# Patient Record
Sex: FEMALE | Race: WHITE | Employment: FULL TIME | URBAN - METROPOLITAN AREA
[De-identification: names, ages, dates, MRNs, and addresses within clinical notes are randomized per-mention and may not be internally consistent; named-entity substitution may affect disease eponyms.]

---

## 2017-10-26 ENCOUNTER — HOSPITAL ENCOUNTER (OUTPATIENT)
Age: 37
Setting detail: SPECIMEN
Discharge: HOME | End: 2017-10-26
Payer: COMMERCIAL

## 2017-10-26 ENCOUNTER — OFFICE VISIT (OUTPATIENT)
Dept: OBGYN CLINIC | Age: 37
End: 2017-10-26
Payer: COMMERCIAL

## 2017-10-26 DIAGNOSIS — N92.6 IRREGULAR MENSES: ICD-10-CM

## 2017-10-26 DIAGNOSIS — N94.10 DYSPAREUNIA, FEMALE: ICD-10-CM

## 2017-10-26 DIAGNOSIS — N94.6 DYSMENORRHEA: ICD-10-CM

## 2017-10-26 DIAGNOSIS — Z01.419 VISIT FOR GYNECOLOGIC EXAMINATION: Primary | ICD-10-CM

## 2017-10-26 DIAGNOSIS — Z01.812 PRE-PROCEDURE LAB EXAM: ICD-10-CM

## 2017-10-26 DIAGNOSIS — R10.2 FEMALE PELVIC PAIN: ICD-10-CM

## 2017-10-26 LAB
DIRECT EXAM: NORMAL
Lab: NORMAL
SPECIMEN DESCRIPTION: NORMAL
STATUS: NORMAL

## 2017-10-26 PROCEDURE — 99395 PREV VISIT EST AGE 18-39: CPT | Performed by: OBSTETRICS & GYNECOLOGY

## 2017-10-26 RX ORDER — MISOPROSTOL 200 UG/1
200 TABLET ORAL 2 TIMES DAILY
Qty: 2 TABLET | Refills: 0 | Status: SHIPPED | OUTPATIENT
Start: 2017-10-26 | End: 2021-02-01

## 2017-10-26 NOTE — PROGRESS NOTES
Yes     Partners: Male     Birth control/ protection: Surgical     Other Topics Concern    Not on file     Social History Narrative    No narrative on file     MEDICATIONS:  Current Outpatient Prescriptions   Medication Sig Dispense Refill    misoprostol (CYTOTEC) 200 MCG tablet Take 1 tablet by mouth 2 times daily One tablet night prior to procedure, one morning prior to procedure 2 tablet 0    meloxicam (MOBIC) 7.5 MG tablet Take 7.5 mg by mouth 2 times daily       No current facility-administered medications for this visit. ALLERGIES:  Allergies as of 10/26/2017    (No Known Allergies)       Symptoms of decreased mood absent    **If either question is answered in a  positive fashion then complete the PHQ9 Scoring Evaluation and make the appropriate referral**    Immunization status: stated as current, but no records available. Gynecologic History:     No LMP recorded. Sexually Active: Yes    STD History: No     Permanent Sterilization: Yes Essure, did not get HSG   Reversible Birth Control: No        Hormone Replacement Exposure: No      Genetic Qualified Family History of Breast, Ovarian , Colon or Uterine Cancer: No     If YES see scanned worksheet. Preventative Health Testing:    Health Maintenance:  Health Maintenance Due   Topic Date Due    HIV screen  01/14/1995    DTaP/Tdap/Td vaccine (1 - Tdap) 01/14/1999    Flu vaccine (1) 09/01/2017       Date of Last Pap Smear: 10/2016, negative  Abnormal Pap Smear History: none  Colposcopy History:   Date of Last Mammogram:   Date of Last Colonoscopy:   Date of Last Bone Density:  ________________________________________________________________________    REVIEW OF SYSTEMS:       A minimum of an eleven point review of systems was completed. Review Of Systems (11 point):  Constitutional: No fever, chills or malaise;  No weight change or fatigue  Head and Eyes: No vision, Headache, Dizziness or trauma in last 12 months  ENT ROS: No hearing, Tinnitis, sinus or taste problems  Hematological and Lymphatic ROS:No Lymphoma, Von Willebrand's, Hemophillia or Bleeding History  Psych ROS: No Depression, Homicidal thoughts,suicidal thoughts, or anxiety  Breast ROS: No prior breast abnormalities or lumps  Respiratory ROS: No SOB, Pneumoniae,Cough, or Pulmonary Embolism History  Cardiovascular ROS: No Chest Pain with Exertion, Palpitations, Syncope, Edema, Arrhythmia  Gastrointestinal ROS: No Indigestion, Heartburn, Nausea, vomiting, Diarrhea, Constipation,or Bowel Changes; No Bloody Stools or melena  Genito-Urinary ROS: No Dysuria, Hematuria or Nocturia. No Urinary Incontinence or Vaginal Discharge + dyspareunia + intermenstrual bleeding  Musculoskeletal ROS: No Arthralgia, Arthritis,Gout,Osteoporosis or Rheumatism  Neurological ROS: No CVA, Migraines, Epilepsy, Seizure Hx, or Limb Weakness  Dermatological ROS: No Rash, Itching, Hives, Mole Changes or Cancer                                                                                                                                                                                                                       PHYSICAL Exam:     Constitutional:  There were no vitals filed for this visit. General Appearance: This  is a well Developed, well Nourished, well groomed female. Her BMI was reviewed. Nutritional decision making was discussed. Skin:  There was a Normal Inspection of the skin without rashes or lesions. There were no rashes. Lymphatic:  No Lymph Nodes were Palpable in the neck , axilla or groin.  0 # Of Lymph Nodes    Neck and EENT:  The neck was supple. There were no masses   The thyroid was not enlarged and had no masses. EOMI B/L    Respiratory: The lungs were auscultated and found to be clear. There were no rales, rhonchi or wheezes. There was a good respiratory effort. Cardiovascular: The heart was in a regular rate and rhythm. . No S3 or S4.  There was no murmur appreciated. Location, grade, and radiation are not applicable. Extremities: The patients extremities were without calf tenderness, edema, or varicosities. There was full range of motion in all four extremities. Pulses in all four extremities were appreciated and are 2/4. Abdomen: The abdomen was soft and non-tender. There were good bowel sounds in all quadrants and there was no guarding, rebound or rigidity. On evaluation there was no evidence of hepatosplenomegaly and there was no costal vertebral yakelin tenderness bilaterally. No hernias were appreciated. Psych: The patient had a normal Orientation to: Time, Place, Person, and Situation  There is no Mood / Affect changes    Breast:  (Chest)  normal appearance, no masses or tenderness  Self breast exams were reviewed in detail. Literature was given. Pelvic Exam:  Vulva and vagina appear normal. Bimanual exam reveals normal uterus and adnexa. Rectal Exam:  exam declined by patient    Musculosk:  Normal Gait and station was noted. Digits were evaluated without abnormal findings. Range of motion, stability and strength were evaluated and found to be appropriate for the patients age. OMM Structural Component:  The patient did not complain of a Chief complaint requiring OMM. Chief Complaint:none    Structural Exam: Refused    ASSESSMENT:      40 y.o. Annual  1. Visit for gynecologic examination  PAP SMEAR    Lipid Panel   2. Dysmenorrhea  TSH with Reflex    US Non OB Transvaginal    US Pelvis Complete    VAGINITIS DNA PROBE    Chlamydia/GC DNA, Thin Prep   3. Irregular menses  CBC Auto Differential    TSH with Reflex    Prolactin    Hemoglobin A1C    US Non OB Transvaginal    US Pelvis Complete    VAGINITIS DNA PROBE    Chlamydia/GC DNA, Thin Prep   4. Dyspareunia, female  US Non OB Transvaginal    US Pelvis Complete    VAGINITIS DNA PROBE    Chlamydia/GC DNA, Thin Prep   5.  Female pelvic pain  US Non OB Transvaginal    US Pelvis Complete VAGINITIS DNA PROBE    Chlamydia/GC DNA, Thin Prep   6. Pre-procedure lab exam  misoprostol (CYTOTEC) 200 MCG tablet          Chief Complaint   Patient presents with    Gynecologic Exam          History reviewed. No pertinent past medical history. Patient Active Problem List    Diagnosis Date Noted    Female pelvic pain 10/26/2017    Dyspareunia, female 10/26/2017    Dysmenorrhea 11/14/2016    Irregular menses 11/14/2016    Hair thinning 10/17/2016    Encounter for post Essure sterilization check 10/17/2016     Pt. Refusing HSG            Hereditary Breast, Ovarian, Colon and Uterine Cancer screening Done. Tobacco & Secondary smoke risks reviewed; instructed on cessation and avoidance      Counseling Completed:  Preventative Health Recommendations and Follow up. Counseling Hormonal Based Birth Control:      The patient was seen and counseled on all forms of birth control both male and female  reversible and non. She is aware that hormonal based birth control may increase her risk of developing a blood clot which may increase her morbidity and or mortality. She was counseled on alternate non hormonal based contraception options. We discussed that smoking and any hormonal based contraception may increase the patients risks of developing these life threatening blood clots. All patients are encouraged to stop smoking at the time of contraceptive counseling. Cessation programs were reviewed. The patient was instructed to use barrier contraception for sexually transmitted disease prevention. The patient was also informed of antibiotics decreasing contraceptive efficacy and the need for barrier contraception from the onset of her antibiotic dosing and through a minimum of thirty days from antibiotic cessation. The life threatening side effect profile was reviewed in detail this includes but is not limited to shortness of breath, chest pain, severe or persistent headaches, or calf pain.   If 10/27/2018     Order Specific Question:   Collection Type     Answer: Thin Prep     Order Specific Question:   Prior Abnormal Pap Test     Answer:   No     Order Specific Question:   Screening or Diagnostic     Answer:   Screening     Order Specific Question:   HPV Requested?      Answer:   Yes     Order Specific Question:   High Risk Patient     Answer:   N/A    CBC Auto Differential     Standing Status:   Future     Standing Expiration Date:   10/26/2018    TSH with Reflex     Standing Status:   Future     Standing Expiration Date:   10/26/2018    Prolactin     Standing Status:   Future     Standing Expiration Date:   10/26/2018    Hemoglobin A1C     Standing Status:   Future     Standing Expiration Date:   10/26/2018    Lipid Panel     Standing Status:   Future     Standing Expiration Date:   10/26/2018     Order Specific Question:   Is Patient Fasting?/# of Hours     Answer:   12

## 2017-10-30 DIAGNOSIS — N94.6 DYSMENORRHEA: ICD-10-CM

## 2017-10-30 DIAGNOSIS — Z01.419 VISIT FOR GYNECOLOGIC EXAMINATION: ICD-10-CM

## 2017-10-30 DIAGNOSIS — N92.6 IRREGULAR MENSES: ICD-10-CM

## 2017-11-01 ENCOUNTER — HOSPITAL ENCOUNTER (OUTPATIENT)
Dept: ULTRASOUND IMAGING | Age: 37
Discharge: HOME | End: 2017-11-01
Payer: COMMERCIAL

## 2017-11-01 DIAGNOSIS — R10.2 FEMALE PELVIC PAIN: ICD-10-CM

## 2017-11-01 DIAGNOSIS — N92.6 IRREGULAR MENSES: ICD-10-CM

## 2017-11-01 DIAGNOSIS — N94.6 DYSMENORRHEA: ICD-10-CM

## 2017-11-01 DIAGNOSIS — N94.10 DYSPAREUNIA, FEMALE: ICD-10-CM

## 2017-11-01 PROCEDURE — 76830 TRANSVAGINAL US NON-OB: CPT

## 2017-11-01 PROCEDURE — 76856 US EXAM PELVIC COMPLETE: CPT

## 2017-11-02 DIAGNOSIS — Z01.419 VISIT FOR GYNECOLOGIC EXAMINATION: ICD-10-CM

## 2017-11-22 ENCOUNTER — PATIENT MESSAGE (OUTPATIENT)
Dept: OBGYN CLINIC | Age: 37
End: 2017-11-22

## 2017-11-22 RX ORDER — MISOPROSTOL 200 UG/1
TABLET ORAL
Qty: 2 TABLET | Refills: 0 | Status: SHIPPED | OUTPATIENT
Start: 2017-11-22 | End: 2021-02-01

## 2017-11-22 NOTE — TELEPHONE ENCOUNTER
200mcg of a pill called cytotec the night before and 200mcg of cytotec the morning of the procedure. Motrin 800mg 30 minutes prior to procedure. Please call and schedule patient for endometrial biopsy.   Irregular menstrual bleeding age > 28

## 2017-12-08 ENCOUNTER — PROCEDURE VISIT (OUTPATIENT)
Dept: OBGYN CLINIC | Age: 37
End: 2017-12-08
Payer: COMMERCIAL

## 2017-12-08 VITALS
WEIGHT: 264 LBS | SYSTOLIC BLOOD PRESSURE: 118 MMHG | DIASTOLIC BLOOD PRESSURE: 68 MMHG | HEIGHT: 70 IN | BODY MASS INDEX: 37.8 KG/M2

## 2017-12-08 DIAGNOSIS — N94.6 DYSMENORRHEA: ICD-10-CM

## 2017-12-08 DIAGNOSIS — Z01.812 PRE-PROCEDURE LAB EXAM: ICD-10-CM

## 2017-12-08 DIAGNOSIS — N92.6 IRREGULAR MENSES: Primary | ICD-10-CM

## 2017-12-08 LAB
CONTROL: NORMAL
PREGNANCY TEST URINE, POC: NORMAL

## 2017-12-08 PROCEDURE — 58100 BIOPSY OF UTERUS LINING: CPT | Performed by: OBSTETRICS & GYNECOLOGY

## 2017-12-08 PROCEDURE — 81025 URINE PREGNANCY TEST: CPT | Performed by: OBSTETRICS & GYNECOLOGY

## 2017-12-13 ENCOUNTER — TELEPHONE (OUTPATIENT)
Dept: OBGYN CLINIC | Age: 37
End: 2017-12-13

## 2017-12-27 NOTE — TELEPHONE ENCOUNTER
Pt was made aware of her results and recommendations.  Pt did make an appointment with Dr. Ileana Arriola after the first of the year per her request.

## 2018-01-05 ENCOUNTER — OFFICE VISIT (OUTPATIENT)
Dept: OBGYN CLINIC | Age: 38
End: 2018-01-05
Payer: COMMERCIAL

## 2018-01-05 VITALS
HEIGHT: 70 IN | SYSTOLIC BLOOD PRESSURE: 118 MMHG | DIASTOLIC BLOOD PRESSURE: 72 MMHG | BODY MASS INDEX: 37.8 KG/M2 | WEIGHT: 264 LBS | RESPIRATION RATE: 16 BRPM

## 2018-01-05 DIAGNOSIS — N85.01 SIMPLE ENDOMETRIAL HYPERPLASIA WITHOUT ATYPIA: Primary | ICD-10-CM

## 2018-01-05 PROCEDURE — 99214 OFFICE O/P EST MOD 30 MIN: CPT | Performed by: OBSTETRICS & GYNECOLOGY

## 2018-01-05 RX ORDER — MEDROXYPROGESTERONE ACETATE 10 MG/1
10 TABLET ORAL DAILY
Qty: 30 TABLET | Refills: 3 | Status: SHIPPED | OUTPATIENT
Start: 2018-01-05 | End: 2018-04-30 | Stop reason: SDUPTHER

## 2018-01-05 NOTE — PROGRESS NOTES
Jad Almaraz  2018    YOB: 1980    The patient was seen today. She is here regarding abnormal EMB. Her bowels are regular and she is voiding without difficulty. HPI:  Jad Almaraz is a 40 y.o. female [address] [place] [address] having intermenstrual and heavy menstrual bleeding. EMB showed simple endometrial hyperplasia without atypia  ________________________________________________________________________    Obstetric History       T0      L0     SAB0   TAB0   Ectopic0   Molar0   Multiple0   Live Births0         History reviewed. No pertinent past medical history. Past Surgical History:   Procedure Laterality Date    HERNIA REPAIR  10weeks old    OTHER SURGICAL HISTORY      [facility]        Family History   Problem Relation Age of Onset    Diabetes Mother     Kidney Disease Father      dialysis    Heart Disease Paternal Grandfather     Dementia Maternal Grandmother     Alzheimer's Disease Maternal Grandmother        Social History     Social History    Marital status:      Spouse name: N/A    Number of children: N/A    Years of education: N/A     Occupational History    Not on file.      Social History Main Topics    Smoking status: Former Smoker     Packs/day: 0.50     Years: 5.00     Types: Cigarettes    Smokeless tobacco: Never Used    Alcohol use No    Drug use: No    Sexual activity: Yes     Partners: Male     Birth control/ protection: Surgical     Other Topics Concern    Not on file     Social History Narrative    No narrative on file     MEDICATIONS:  Current Outpatient Prescriptions   Medication Sig Dispense Refill    medroxyPROGESTERone (PROVERA) 10 MG tablet Take 1 tablet by mouth daily 30 tablet 3    misoprostol (CYTOTEC) 200 MCG tablet Take 200 mcg table night before procedure - then take 200 mcg tablet PO morning of procedure 2 tablet 0    misoprostol (CYTOTEC) 200 MCG tablet Take 1 tablet by mouth 2 times daily One tablet night prior to procedure, one morning prior to procedure 2 tablet 0    meloxicam (MOBIC) 7.5 MG tablet Take 7.5 mg by mouth 2 times daily       No current facility-administered medications for this visit. ALLERGIES:  Allergies as of 01/05/2018    (No Known Allergies)     REVIEW OF SYSTEMS:       A minimum of an eleven point review of systems was completed. Review Of Systems (11 point):  Constitutional: No fever, chills or malaise; No weight change or fatigue  Head and Eyes: No vision, Headache, Dizziness or trauma in last 12 months  ENT ROS: No hearing, Tinnitis, sinus or taste problems  Hematological and Lymphatic ROS:No Lymphoma, Von Willebrand's, Hemophillia or Bleeding History  Psych ROS: No Depression, Homicidal thoughts,suicidal thoughts, or anxiety  Breast ROS: No prior breast abnormalities or lumps  Respiratory ROS: No SOB, Pneumoniae,Cough, or Pulmonary Embolism History  Cardiovascular ROS: No Chest Pain with Exertion, Palpitations, Syncope, Edema, Arrhythmia  Gastrointestinal ROS: No Indigestion, Heartburn, Nausea, vomiting, Diarrhea, Constipation,or Bowel Changes; No Bloody Stools or melena  Genito-Urinary ROS: No Dysuria, Hematuria or Nocturia. No Urinary Incontinence or Vaginal Discharge  Musculoskeletal ROS: No Arthralgia, Arthritis,Gout,Osteoporosis or Rheumatism  Neurological ROS: No CVA, Migraines, Epilepsy, Seizure Hx, or Limb Weakness  Dermatological ROS: No Rash, Itching, Hives, Mole Changes or Cancer    Blood pressure 118/72, resp. rate 16, height 5' 10\" (1.778 m), weight 264 lb (119.7 kg), last menstrual period 12/29/2017, not currently breastfeeding. Gen: AOx3, NAD, elevated BMI    Heart: RRR no m/r/g    Abdomen: Soft non-tender; good bowel sounds. No guarding, rebound or rigidity. No CVA tenderness bilaterally. Extremities: No calf tenderness, DTR 2/4, and No edema bilaterally    Pelvic: Exam deferred. Diagnostics:  No results found.     Lab Results:  Results for orders placed or performed in visit on 12/08/17   POCT urine pregnancy   Result Value Ref Range    Preg Test, Ur neg     Control           Assessment:  1. Simple endometrial hyperplasia without atypia  medroxyPROGESTERone (PROVERA) 10 MG tablet     Patient Active Problem List    Diagnosis Date Noted    Female pelvic pain 10/26/2017    Dyspareunia, female 10/26/2017    Dysmenorrhea 11/14/2016    Irregular menses 11/14/2016    Hair thinning 10/17/2016    Encounter for post Essure sterilization check 10/17/2016     Pt. Refusing HSG         Counseling Hormonal Based Birth Control:      She is aware that hormonal based birth control may increase her risk of developing a blood clot which may increase her morbidity and or mortality. She was counseled on alternate non hormonal based contraception options. We discussed that smoking and any hormonal based contraception may increase the patients risks of developing these life threatening blood clots. All patients are encouraged to stop smoking at the time of contraceptive counseling. Cessation programs were reviewed. The patient was instructed to use barrier contraception for sexually transmitted disease prevention. The patient was also informed of antibiotics decreasing contraceptive efficacy and the need for barrier contraception from the onset of her antibiotic dosing and through a minimum of thirty days from antibiotic cessation. The life threatening side effect profile was reviewed in detail this includes but is not limited to shortness of breath, chest pain, severe or persistent headaches, or calf pain. If any of these occur the patient has been instructed to stop using her hormonal based contraception, notify the office, and go to the emergency department or call 911.     The patient denied any personal history of blood clots in her leg, lung, or heart and denied any family history of stroke, TIA, sudden cardiac death < 36 y.o.,pulmonary embolism, or deep venous thrombosis. PLAN:  Return in about 6 months (around 7/5/2018) for EMB. Provera 10mg daily for treatment of endometrial hyperplasia  Repeat Annual every 1 year  Cervical Cytology Evaluation begins at 24years old. If Negative Cytology, Follow-up screening per current guidelines. Counseled on preventative health maintenance follow-up. No orders of the defined types were placed in this encounter. Patient was seen with total face to face time of 25 minutes. More than 50% of this visit was counseling and education regarding The encounter diagnosis was Simple endometrial hyperplasia without atypia. and Follow Up After Procedure (Follow up after EMB and discuss options for treatment of Simply Hyperplasia found on EMB pathology )   as well as  counseling on preventative health maintenance follow-up.

## 2018-04-30 DIAGNOSIS — N85.01 SIMPLE ENDOMETRIAL HYPERPLASIA WITHOUT ATYPIA: ICD-10-CM

## 2018-04-30 RX ORDER — MEDROXYPROGESTERONE ACETATE 10 MG/1
10 TABLET ORAL DAILY
Qty: 30 TABLET | Refills: 3 | Status: SHIPPED | OUTPATIENT
Start: 2018-04-30 | End: 2018-09-17 | Stop reason: SDUPTHER

## 2018-05-09 ENCOUNTER — PATIENT MESSAGE (OUTPATIENT)
Dept: OBGYN CLINIC | Age: 38
End: 2018-05-09

## 2018-05-09 DIAGNOSIS — L65.9 HAIR LOSS: Primary | ICD-10-CM

## 2018-05-14 DIAGNOSIS — L65.9 HAIR LOSS: ICD-10-CM

## 2018-09-17 DIAGNOSIS — N85.01 SIMPLE ENDOMETRIAL HYPERPLASIA WITHOUT ATYPIA: ICD-10-CM

## 2018-09-18 RX ORDER — MEDROXYPROGESTERONE ACETATE 10 MG/1
10 TABLET ORAL DAILY
Qty: 30 TABLET | Refills: 3 | Status: SHIPPED | OUTPATIENT
Start: 2018-09-18 | End: 2021-02-01

## 2021-02-01 ENCOUNTER — OFFICE VISIT (OUTPATIENT)
Dept: OBGYN CLINIC | Age: 41
End: 2021-02-01
Payer: COMMERCIAL

## 2021-02-01 ENCOUNTER — HOSPITAL ENCOUNTER (OUTPATIENT)
Age: 41
Setting detail: SPECIMEN
Discharge: HOME | End: 2021-02-01
Payer: COMMERCIAL

## 2021-02-01 VITALS
BODY MASS INDEX: 37.22 KG/M2 | TEMPERATURE: 97 F | DIASTOLIC BLOOD PRESSURE: 64 MMHG | WEIGHT: 260 LBS | HEIGHT: 70 IN | SYSTOLIC BLOOD PRESSURE: 122 MMHG

## 2021-02-01 DIAGNOSIS — Z12.31 VISIT FOR SCREENING MAMMOGRAM: ICD-10-CM

## 2021-02-01 DIAGNOSIS — N92.6 IRREGULAR MENSTRUAL BLEEDING: ICD-10-CM

## 2021-02-01 DIAGNOSIS — Z01.419 ENCOUNTER FOR ANNUAL ROUTINE GYNECOLOGICAL EXAMINATION: Primary | ICD-10-CM

## 2021-02-01 PROCEDURE — 99396 PREV VISIT EST AGE 40-64: CPT | Performed by: NURSE PRACTITIONER

## 2021-02-01 PROCEDURE — G8484 FLU IMMUNIZE NO ADMIN: HCPCS | Performed by: NURSE PRACTITIONER

## 2021-02-01 ASSESSMENT — ENCOUNTER SYMPTOMS
DIARRHEA: 0
ABDOMINAL PAIN: 0
COLOR CHANGE: 0
RHINORRHEA: 0
SHORTNESS OF BREATH: 0
VOMITING: 0
CONSTIPATION: 0
COUGH: 0
BACK PAIN: 0
NAUSEA: 0

## 2021-02-01 NOTE — PATIENT INSTRUCTIONS
Patient Education      Patient Education        Learning About Breast Cancer Screening  What is breast cancer screening? Breast cancer occurs when cells that are not normal grow in one or both of your breasts. Screening tests can help find breast cancer early. Cancer is easier to treat when it's found early. Having concerns about breast cancer is common. That's why it's important to talk with your doctor about when to start and how often to get screened for breast cancer. How is breast cancer screening done? Several screening tests can be used to check for breast cancer. Mammograms. These tests check for signs of cancer using X-rays. They can show tumors that are too small for you or your doctor to feel. During a mammogram, a machine squeezes your breasts to make them flatter and easier to X-ray. At least two pictures are taken of each breast. One is taken from the top and one from the side. 3-D mammograms. These tests are also called digital breast tomosynthesis. Your breast is positioned on a flat plate. A top plate is pressed against your breast to keep it in position. The X-ray arm then moves in an arc above the breast and takes many pictures. A computer uses these X-rays to create a three-dimensional image. Clinical breast exam.   In this exam, your doctor carefully feels your breasts and under your arms to check for lumps or other changes. Who should be screened for breast cancer? Experts agree that mammograms are the best screening test for people at average risk of breast cancer. But they don't all agree on the age at which screening should start. And they don't agree on whether it's better to be screened every year or every two years. Here are some of the recommendations from experts:  · Start by age 36 and have a mammogram each year. · Start at age 39 and have a mammogram each year. · Start at age 48 and have a mammogram every 2 years. When to stop having mammograms is another decision. You and your doctor can decide on the right age to start and stop screening based on your personal preferences and overall health. What is your risk for breast cancer? If you don't already know your risk of breast cancer, you can ask your doctor about it. You can also look it up at www.cancer.gov/bcrisktool/. If your doctor says that you have a high or very high risk, ask about ways to reduce your risk. These could include getting extra screening, taking medicine, or having surgery. If you have a strong family history of breast cancer, ask your doctor about genetic testing. What steps can you take to stay healthy? Some things that increase your risk of breast cancer, such as your age and being female, cannot be controlled. But you can do some things to stay as healthy as you can. · Learn what your breasts normally look and feel like. If you notice any changes, tell your doctor. · If you drink alcohol, limit how much you drink. Any amount of alcohol may increase your risk for some types of cancer. · If you smoke, quit. When you quit smoking, you lower your chances of getting many types of cancer. You can also do your best to eat well, be active, and stay at a healthy weight. Eating healthy foods and being active every day, as well as staying at a healthy weight, may help prevent cancer. Where can you learn more? Go to https://[employer]. org and sign in to your [employer] account. Enter Y625 in the GiveForward box to learn more about \"Learning About Breast Cancer Screening. \"     If you do not have an account, please click on the \"Sign Up Now\" link. Current as of: April 29, 2020               Content Version: 12.6  © 2897-0574 [employer]. Care instructions adapted under license by [facility] ([facility]).  If you have questions about a medical condition or this instruction, always ask your healthcare professional. Trevor Chau disclaims any warranty or liability

## 2021-02-01 NOTE — PROGRESS NOTES
Lina Berry is a 39 y.o.  here for her annual exam.  The patient was seen and examined. The patients past medical, surgical, social and family history were reviewed. Current medications and allergies were reviewed, and documented in the chart. She is single  She is employed as     Exercise Yes  Diet Denies specific diet  Tobacco abuse No    Last PAP: 2017-negative, hx of abnormal PAP no  Family hx uterine or ovarian cancer-denies  Last mammogram if indicated- has never had, Family hx of breast cancer -denies  family hx colon cancer -denies        Sexually active: yesDyspareunia: No, Vaginal discharge: no,  UTI symptoms: no, voiding difficulties: no, bowels regular:Yes bloating:no      Menstrual history:  Menarche age- 15, cycle is very irregular, she can't even tell me if she has period monthly or how long she will go without menses,  Will sometimes just spot intermittently  for couple days then sometimes only have1 day of heavy bleeding that will be very short but heavy for an hour or so, will affect ADLs If heavy. She states periods have always been somewhat irregular. She was seen in our office in 2016 had workup labs and pelvic US then had  been recommended to follow up to discuss progesterone therapy vs. Surgical management, but she never did. She denies  significant weight changes, significant stressors, fatigue,  palpitations,  galactorrhea, hirsutism,  easy bruising/bleeding. Denies pelvic pain, vaginal d/c odor or itching. Denies concern STI. She does admit to hair loss. Denies heat intolerance or vaginal dryness.    Birth control: [place] in   LMP: 21          OB History    Para Term  AB Living   0 0 0 0 0 0   SAB TAB Ectopic Molar Multiple Live Births   0 0 0   0         Vitals:    21 1539   BP: 122/64   Site: Right Upper Arm   Position: Sitting   Cuff Size: Large Adult   Temp: 97 °F (36.1 °C)   Weight: 260 lb (117.9 kg)   Height: 5' 10\" (1.778 m) polydipsia and polyuria.        + scalp hair loss   Genitourinary: Positive for menstrual problem. Negative for dyspareunia, dysuria, flank pain, pelvic pain, vaginal bleeding, vaginal discharge and vaginal pain. Musculoskeletal: Negative for back pain and myalgias. Skin: Negative for color change and rash. Neurological: Negative for dizziness, light-headedness and headaches. Hematological: Negative for adenopathy. Does not bruise/bleed easily. Psychiatric/Behavioral: Negative for self-injury and suicidal ideas. Objective:     Physical Exam  Vitals signs and nursing note reviewed. Constitutional:       General: She is not in acute distress. Appearance: She is well-developed. She is not diaphoretic. HENT:      Head: Normocephalic and atraumatic. Right Ear: External ear normal.      Left Ear: External ear normal.      Nose: Nose normal.   Eyes:      Pupils: Pupils are equal, round, and reactive to light. Neck:      Musculoskeletal: Normal range of motion and neck supple. Thyroid: No thyromegaly. Cardiovascular:      Rate and Rhythm: Normal rate and regular rhythm. Heart sounds: Normal heart sounds. No murmur. No friction rub. No gallop. Comments: No bilateral calf tenderness or swelling  Pulmonary:      Effort: Pulmonary effort is normal. No respiratory distress. Breath sounds: Normal breath sounds. No wheezing. Abdominal:      General: Bowel sounds are normal.      Palpations: Abdomen is soft. Tenderness: There is no abdominal tenderness. Genitourinary:     Comments: Breasts nipples everted, no masses or tenderness, does BSE  Vulva-no lesions  Vagina-pink rugated  Cervix-firm, 2 cm. Nontender, freely movable, no lesions  Uterus-ant. Smooth, firm, nontender, freely movable  Adnexa-no masses or tenderness   Musculoskeletal: Normal range of motion. Lymphadenopathy:      Cervical: No cervical adenopathy. Skin:     General: Skin is warm and dry. Findings: No rash. Neurological:      Mental Status: She is alert and oriented to person, place, and time. Cranial Nerves: No cranial nerve deficit. Deep Tendon Reflexes: Reflexes are normal and symmetric. Psychiatric:         Behavior: Behavior normal.         Thought Content: Thought content normal.         Judgment: Judgment normal.       /64 (Site: Right Upper Arm, Position: Sitting, Cuff Size: Large Adult)   Temp 97 °F (36.1 °C)   Ht 5' 10\" (1.778 m)   Wt 260 lb (117.9 kg)   LMP 01/27/2021   BMI 37.31 kg/m²     Assessment:       Diagnosis Orders   1. Encounter for annual routine gynecological examination  PAP SMEAR   2. Visit for screening mammogram  EDGAR DIGITAL SCREEN W OR WO CAD BILATERAL   3. Irregular menstrual bleeding  CBC Auto Differential    TSH with Reflex    HCG, Quantitative, Pregnancy    US NON OB TRANSVAGINAL    Prolactin    Follicle Stimulating Hormone    Insulin, total    Basic Metabolic Panel    US PELVIS COMPLETE       Breast exam completed. Pelvic exam pap smear collected and sent. Cultures sent No    Plan:   Collect pap   BSE reviewed, Mammogram ordered  STD prevention reviewed  Gardisil counseling completed for all patients 10-35 yo    Cultures declined   Labs (CBC, bmp, FI, tsh, fsh, hcg, prolactin) and pelvic US ordered. Discussed will need EMB  Discussed possible management of AUB discussed with patient. Pros and cons of medical management with OCPs, Depo-Provera injections or cyclic progestogens, IUD vs EMB, surgical management with endometrial ablation, hysteroscopy D &C,  or hysterectomy. Diet & Exercise reviewed with pt.     Preventive  Health through PCP   RV after pelvic US and labs completed for EMB          Orders Placed This Encounter   Procedures    EDGAR DIGITAL SCREEN W OR WO CAD BILATERAL     Standing Status:   Future     Standing Expiration Date:   4/1/2022     Order Specific Question:   Reason for exam:     Answer:   Nelli Simmons NON OB TRANSVAGINAL     Begin with transabdominal imaging. Standing Status:   Future     Standing Expiration Date:   2/1/2022     Order Specific Question:   Reason for exam:     Answer:   abnormal bleeding    US PELVIS COMPLETE     This procedure can be scheduled via [email or web address]. Access your [email or web address] account by visiting [email or web address]. Standing Status:   Future     Standing Expiration Date:   2/1/2022    PAP SMEAR     Patient History:    No LMP recorded. OBGYN Status: Having periods  Past Surgical History:  7 weeks old: HERNIA REPAIR  2012: OTHER SURGICAL HISTORY      Comment:  essure       Social History    Tobacco Use      Smoking status: Former Smoker        Packs/day: 0.50        Years: 5.00        Pack years: 2.5        Types: Cigarettes      Smokeless tobacco: Never Used       Standing Status:   Future     Standing Expiration Date:   2/2/2022     Order Specific Question:   Collection Type     Answer: Thin Prep     Order Specific Question:   Prior Abnormal Pap Test     Answer:   No     Order Specific Question:   Screening or Diagnostic     Answer:   Screening     Order Specific Question:   HPV Requested?      Answer:   Yes     Order Specific Question:   High Risk Patient     Answer:   N/A    CBC Auto Differential     Standing Status:   Future     Standing Expiration Date:   2/1/2022    TSH with Reflex     Standing Status:   Future     Standing Expiration Date:   2/1/2022    HCG, Quantitative, Pregnancy     Standing Status:   Future     Standing Expiration Date:   2/1/2022    Prolactin     Standing Status:   Future     Standing Expiration Date:   2/0/6803    Follicle Stimulating Hormone     Standing Status:   Future     Standing Expiration Date:   2/1/2022    Insulin, total     Patient must be fasting for 12-14 hrs     Standing Status:   Future     Standing Expiration Date:   3/3/2021    Basic Metabolic Panel     Standing Status:   Future     Standing Expiration Date:   2/1/2022     No orders of the defined types were placed in this encounter. Patient given educational materials - seepatient instructions. Discussed use, benefit, and side effects of prescribed medications. All patient questions answered. Pt voiced understanding. Reviewed health maintenance. Instructed to continue current medications, diet and exercise. Patient agreedwith treatment plan. Follow up as directed.       Electronically signed by MAGDIEL Kramer CNP on 2/1/2021at 4:12 PM

## 2021-02-05 LAB
HPV SAMPLE: NORMAL
HPV, GENOTYPE 16: NOT DETECTED
HPV, GENOTYPE 18: NOT DETECTED
HPV, HIGH RISK OTHER: NOT DETECTED
HPV, INTERPRETATION: NORMAL
SPECIMEN DESCRIPTION: NORMAL

## 2021-02-06 ENCOUNTER — HOSPITAL ENCOUNTER (OUTPATIENT)
Facility: CLINIC | Age: 41
Discharge: HOME | End: 2021-02-06
Payer: COMMERCIAL

## 2021-02-06 DIAGNOSIS — N92.6 IRREGULAR MENSTRUAL BLEEDING: ICD-10-CM

## 2021-02-06 LAB
ABSOLUTE EOS #: 0.19 K/UL (ref 0–0.44)
ABSOLUTE IMMATURE GRANULOCYTE: <0.03 K/UL (ref 0–0.3)
ABSOLUTE LYMPH #: 1.86 K/UL (ref 1.1–3.7)
ABSOLUTE MONO #: 0.35 K/UL (ref 0.1–1.2)
ANION GAP SERPL CALCULATED.3IONS-SCNC: 13 MMOL/L (ref 9–17)
BASOPHILS # BLD: 1 % (ref 0–2)
BASOPHILS ABSOLUTE: 0.04 K/UL (ref 0–0.2)
BUN BLDV-MCNC: 12 MG/DL (ref 6–20)
BUN/CREAT BLD: ABNORMAL (ref 9–20)
CALCIUM SERPL-MCNC: 9 MG/DL (ref 8.6–10.4)
CHLORIDE BLD-SCNC: 108 MMOL/L (ref 98–107)
CO2: 23 MMOL/L (ref 20–31)
CREAT SERPL-MCNC: 0.68 MG/DL (ref 0.5–0.9)
DIFFERENTIAL TYPE: ABNORMAL
EOSINOPHILS RELATIVE PERCENT: 3 % (ref 1–4)
FOLLICLE STIMULATING HORMONE: 7 U/L (ref 1.7–21.5)
GFR AFRICAN AMERICAN: >60 ML/MIN
GFR NON-AFRICAN AMERICAN: >60 ML/MIN
GFR SERPL CREATININE-BSD FRML MDRD: ABNORMAL ML/MIN/{1.73_M2}
GFR SERPL CREATININE-BSD FRML MDRD: ABNORMAL ML/MIN/{1.73_M2}
GLUCOSE BLD-MCNC: 90 MG/DL (ref 70–99)
HCG QUANTITATIVE: <1 IU/L
HCT VFR BLD CALC: 43.8 % (ref 36.3–47.1)
HEMOGLOBIN: 14.5 G/DL (ref 11.9–15.1)
IMMATURE GRANULOCYTES: 0 %
INSULIN COMMENT: NORMAL
INSULIN REFERENCE RANGE:: NORMAL
INSULIN: 15.1 MU/L
LYMPHOCYTES # BLD: 25 % (ref 24–43)
MCH RBC QN AUTO: 30.5 PG (ref 25.2–33.5)
MCHC RBC AUTO-ENTMCNC: 33.1 G/DL (ref 28.4–34.8)
MCV RBC AUTO: 92 FL (ref 82.6–102.9)
MONOCYTES # BLD: 5 % (ref 3–12)
NRBC AUTOMATED: 0 PER 100 WBC
PDW BLD-RTO: 12.5 % (ref 11.8–14.4)
PLATELET # BLD: 293 K/UL (ref 138–453)
PLATELET ESTIMATE: ABNORMAL
PMV BLD AUTO: 11.1 FL (ref 8.1–13.5)
POTASSIUM SERPL-SCNC: 4.4 MMOL/L (ref 3.7–5.3)
PROLACTIN: 4.66 UG/L (ref 4.79–23.3)
RBC # BLD: 4.76 M/UL (ref 3.95–5.11)
RBC # BLD: ABNORMAL 10*6/UL
SEG NEUTROPHILS: 66 % (ref 36–65)
SEGMENTED NEUTROPHILS ABSOLUTE COUNT: 4.96 K/UL (ref 1.5–8.1)
SODIUM BLD-SCNC: 144 MMOL/L (ref 135–144)
TSH SERPL DL<=0.05 MIU/L-ACNC: 2.92 MIU/L (ref 0.3–5)
WBC # BLD: 7.4 K/UL (ref 3.5–11.3)
WBC # BLD: ABNORMAL 10*3/UL

## 2021-02-06 PROCEDURE — 83001 ASSAY OF GONADOTROPIN (FSH): CPT

## 2021-02-06 PROCEDURE — 84146 ASSAY OF PROLACTIN: CPT

## 2021-02-06 PROCEDURE — 80048 BASIC METABOLIC PNL TOTAL CA: CPT

## 2021-02-06 PROCEDURE — 36415 COLL VENOUS BLD VENIPUNCTURE: CPT

## 2021-02-06 PROCEDURE — 83525 ASSAY OF INSULIN: CPT

## 2021-02-06 PROCEDURE — 84443 ASSAY THYROID STIM HORMONE: CPT

## 2021-02-06 PROCEDURE — 85025 COMPLETE CBC W/AUTO DIFF WBC: CPT

## 2021-02-06 PROCEDURE — 84702 CHORIONIC GONADOTROPIN TEST: CPT

## 2021-02-08 ENCOUNTER — TELEPHONE (OUTPATIENT)
Dept: OBGYN CLINIC | Age: 41
End: 2021-02-08

## 2021-02-08 NOTE — TELEPHONE ENCOUNTER
----- Message from Darcie Lopez DO sent at 2/7/2021 10:15 AM EST -----  History of hyperplasia. If progesterone is not working we may need to discuss hysterectomy. Thank you.

## 2021-02-08 NOTE — TELEPHONE ENCOUNTER
Her labs were normal with exception of low prolactin I would be more worried if elevated but recommend repeat in 4-6 weeks. Her pelvic US shows :  Uterus: 9.6x5. 4x4.2cm, Anteverted heterogenous uterus  Anechoic areas throughout fundus, multiple and sub 1cm. Adenomyosis? Rt ovary has cyst with simple appear daughter cyst 3.4x3. 1x2.8cm  Left ovary not visualized  No free fluid in pelvis        She has hx of hyperplasia and had previously been recommended progesterone therapy for treatment but she never followed up or started progesterone,     I would recommend at this point she set up consult with Dr. Humberto Austin to discuss options if she does not want to do progesterone therapy and needing repeat EMB.

## 2021-02-12 LAB — CYTOLOGY REPORT: NORMAL

## 2021-04-29 ENCOUNTER — OFFICE VISIT (OUTPATIENT)
Dept: FAMILY MEDICINE CLINIC | Age: 41
End: 2021-04-29

## 2021-04-29 VITALS
TEMPERATURE: 97.4 F | WEIGHT: 271 LBS | OXYGEN SATURATION: 98 % | DIASTOLIC BLOOD PRESSURE: 82 MMHG | BODY MASS INDEX: 41.07 KG/M2 | HEART RATE: 67 BPM | HEIGHT: 68 IN | SYSTOLIC BLOOD PRESSURE: 123 MMHG

## 2021-04-29 DIAGNOSIS — R07.9 CHEST PAIN IN ADULT: ICD-10-CM

## 2021-04-29 DIAGNOSIS — Z11.4 ENCOUNTER FOR SCREENING FOR HIV: ICD-10-CM

## 2021-04-29 DIAGNOSIS — Z86.018 HISTORY OF UTERINE FIBROID: ICD-10-CM

## 2021-04-29 DIAGNOSIS — E53.9 VITAMIN B DEFICIENCY: ICD-10-CM

## 2021-04-29 DIAGNOSIS — E66.01 MORBID OBESITY (HCC): ICD-10-CM

## 2021-04-29 DIAGNOSIS — Z13.1 SCREENING FOR DIABETES MELLITUS: Primary | ICD-10-CM

## 2021-04-29 DIAGNOSIS — E55.9 VITAMIN D DEFICIENCY: ICD-10-CM

## 2021-04-29 DIAGNOSIS — Z82.49 FH: CAD (CORONARY ARTERY DISEASE): ICD-10-CM

## 2021-04-29 DIAGNOSIS — Z13.220 SCREENING, LIPID: ICD-10-CM

## 2021-04-29 DIAGNOSIS — Z11.59 NEED FOR HEPATITIS C SCREENING TEST: ICD-10-CM

## 2021-04-29 PROCEDURE — 1036F TOBACCO NON-USER: CPT | Performed by: FAMILY MEDICINE

## 2021-04-29 PROCEDURE — G8427 DOCREV CUR MEDS BY ELIG CLIN: HCPCS | Performed by: FAMILY MEDICINE

## 2021-04-29 PROCEDURE — G8417 CALC BMI ABV UP PARAM F/U: HCPCS | Performed by: FAMILY MEDICINE

## 2021-04-29 PROCEDURE — 99204 OFFICE O/P NEW MOD 45 MIN: CPT | Performed by: FAMILY MEDICINE

## 2021-04-29 SDOH — ECONOMIC STABILITY: TRANSPORTATION INSECURITY
IN THE PAST 12 MONTHS, HAS THE LACK OF TRANSPORTATION KEPT YOU FROM MEDICAL APPOINTMENTS OR FROM GETTING MEDICATIONS?: NOT ASKED

## 2021-04-29 SDOH — ECONOMIC STABILITY: FOOD INSECURITY: WITHIN THE PAST 12 MONTHS, YOU WORRIED THAT YOUR FOOD WOULD RUN OUT BEFORE YOU GOT MONEY TO BUY MORE.: NEVER TRUE

## 2021-04-29 SDOH — ECONOMIC STABILITY: TRANSPORTATION INSECURITY
IN THE PAST 12 MONTHS, HAS LACK OF TRANSPORTATION KEPT YOU FROM MEETINGS, WORK, OR FROM GETTING THINGS NEEDED FOR DAILY LIVING?: NOT ASKED

## 2021-04-29 SDOH — ECONOMIC STABILITY: FOOD INSECURITY: WITHIN THE PAST 12 MONTHS, THE FOOD YOU BOUGHT JUST DIDN'T LAST AND YOU DIDN'T HAVE MONEY TO GET MORE.: NEVER TRUE

## 2021-04-29 ASSESSMENT — ENCOUNTER SYMPTOMS
EYE DISCHARGE: 0
COUGH: 0
VOICE CHANGE: 0
RECTAL PAIN: 0
EYE PAIN: 0
BLOOD IN STOOL: 0
CHEST TIGHTNESS: 0
ABDOMINAL DISTENTION: 0
COLOR CHANGE: 0
CONSTIPATION: 0
BACK PAIN: 0
TROUBLE SWALLOWING: 0
EYE REDNESS: 0
VOMITING: 0
ANAL BLEEDING: 0
NAUSEA: 0
ABDOMINAL PAIN: 0
DIARRHEA: 0
SHORTNESS OF BREATH: 0
SINUS PRESSURE: 0

## 2021-04-29 ASSESSMENT — PATIENT HEALTH QUESTIONNAIRE - PHQ9
SUM OF ALL RESPONSES TO PHQ9 QUESTIONS 1 & 2: 0
2. FEELING DOWN, DEPRESSED OR HOPELESS: 0
1. LITTLE INTEREST OR PLEASURE IN DOING THINGS: 0
SUM OF ALL RESPONSES TO PHQ QUESTIONS 1-9: 0
1. LITTLE INTEREST OR PLEASURE IN DOING THINGS: 0
SUM OF ALL RESPONSES TO PHQ QUESTIONS 1-9: 0
SUM OF ALL RESPONSES TO PHQ QUESTIONS 1-9: 0

## 2021-04-29 NOTE — PROGRESS NOTES
Subjective:      Patient ID: Derrick Rudolph is a 39 y.o. female. HPI  has experienced discomfort in her chest on and off in the mid chest area for the past 2 months. .   Has had an episode once a day this week , but so far today she is ok.  was sterilized in  as she did not want kids. lasting from a few seconds to an hour. Feels no palpitations, no SOB, no refluxes,  does feel   slightly lightheaded now and then but not all the time. States happens at rest as well as when as when she is active, not related to food. Never woken her up at night from sleep.  used to smoke, 1/2 PPD for 5 years an quit in . Does not use alcohol or other drugs of recreation. Eats as healthy as she can.  stopped going out to eat a month and a half ago and now cooks daily at home. States stopped exercising a month ago-  felt better when she was exercising. States no asthma . Never been on no meds regularly. Dad  of a blood clot at age 58 years and had H/O CAD. Mom is healthy. States H/O painless but irregular periods , sees Dr low for this. Was advised to do endometrial biopsy- has not set it up yet. Review of Systems   Constitutional: Negative for activity change, appetite change and fatigue. HENT: Negative for dental problem, ear pain, hearing loss, postnasal drip, sinus pressure, sneezing, tinnitus, trouble swallowing and voice change. Eyes: Negative for pain, discharge, redness and visual disturbance. Respiratory: Negative for cough, chest tightness and shortness of breath. Cardiovascular: Positive for chest pain. Negative for palpitations and leg swelling. Gastrointestinal: Negative for abdominal distention, abdominal pain, anal bleeding, blood in stool, constipation, diarrhea, nausea, rectal pain and vomiting. Endocrine: Negative for cold intolerance, heat intolerance, polydipsia, polyphagia and polyuria.    Genitourinary: Negative for decreased urine volume, difficulty urinating, dyspareunia, dysuria, enuresis, flank pain, frequency, genital sores, hematuria, menstrual problem, pelvic pain, urgency, vaginal bleeding and vaginal discharge. Musculoskeletal: Negative for arthralgias, back pain, gait problem, joint swelling, myalgias, neck pain and neck stiffness. Skin: Negative for color change, pallor and rash. Allergic/Immunologic: Negative for environmental allergies, food allergies and immunocompromised state. Neurological: Negative for dizziness, tremors, seizures, syncope, facial asymmetry, speech difficulty, weakness, light-headedness, numbness and headaches. Hematological: Negative for adenopathy. Does not bruise/bleed easily. Psychiatric/Behavioral: Negative for agitation, behavioral problems, confusion, decreased concentration, sleep disturbance and suicidal ideas. The patient is not nervous/anxious. Objective:   Physical Exam  Constitutional:       General: She is not in acute distress. HENT:      Head: Normocephalic and atraumatic. Right Ear: External ear normal.      Left Ear: External ear normal.      Nose: Nose normal.      Mouth/Throat:      Mouth: Mucous membranes are moist.   Eyes:      Conjunctiva/sclera: Conjunctivae normal.      Pupils: Pupils are equal, round, and reactive to light. Neck:      Musculoskeletal: Normal range of motion. Thyroid: No thyromegaly. Trachea: No tracheal deviation. Cardiovascular:      Rate and Rhythm: Normal rate and regular rhythm. Pulses: Normal pulses. Heart sounds: Normal heart sounds. No murmur. No friction rub. No gallop. Pulmonary:      Effort: Pulmonary effort is normal. No respiratory distress. Breath sounds: Normal breath sounds. No stridor. No wheezing or rales. Chest:      Chest wall: No tenderness. Abdominal:      General: Bowel sounds are normal. There is no distension. Palpations: Abdomen is soft. Tenderness:  There is no abdominal tenderness. There is no rebound. Musculoskeletal: Normal range of motion. Lymphadenopathy:      Cervical: No cervical adenopathy. Skin:     General: Skin is warm. Coloration: Skin is not pale. Findings: No erythema or rash. Neurological:      General: No focal deficit present. Mental Status: She is alert and oriented to person, place, and time. Mental status is at baseline. Cranial Nerves: No cranial nerve deficit. Motor: No abnormal muscle tone. Deep Tendon Reflexes: Reflexes normal.   Psychiatric:         Mood and Affect: Mood normal.         Behavior: Behavior normal.         Thought Content: Thought content normal.         Judgment: Judgment normal.         Assessment:       Diagnosis Orders   1. Screening for diabetes mellitus     2. Screening, lipid  Lipid Panel    Hepatic Function Panel   3. Need for hepatitis C screening test  Hepatitis C Antibody   4. Encounter for screening for HIV  HIV Screen   5. Vitamin D deficiency  Vitamin D 25 Hydroxy   6. Vitamin B deficiency  Hemoglobin A1C    Folate    Vitamin B12   7. History of uterine fibroid     8. Morbid obesity ([address]     9. Chest pain in adult  CARDIAC STRESS TEST EXERCISE ONLY         Plan:      Orders Placed This Encounter   Procedures    Hemoglobin A1C    Folate    Lipid Panel    Vitamin D 25 Hydroxy    Vitamin B12    Hepatitis C Antibody    HIV Screen    Hepatic Function Panel    CARDIAC STRESS TEST EXERCISE ONLY       No outpatient encounter medications on file as of 4/29/2021. No facility-administered encounter medications on file as of 4/29/2021. No outpatient encounter medications on file as of 4/29/2021. No facility-administered encounter medications on file as of 4/29/2021.             Monique Young MD

## 2021-04-30 ENCOUNTER — HOSPITAL ENCOUNTER (OUTPATIENT)
Facility: CLINIC | Age: 41
Discharge: HOME | End: 2021-04-30
Payer: COMMERCIAL

## 2021-04-30 DIAGNOSIS — Z11.4 ENCOUNTER FOR SCREENING FOR HIV: ICD-10-CM

## 2021-04-30 DIAGNOSIS — Z13.220 SCREENING, LIPID: ICD-10-CM

## 2021-04-30 DIAGNOSIS — E55.9 VITAMIN D DEFICIENCY: ICD-10-CM

## 2021-04-30 DIAGNOSIS — E53.9 VITAMIN B DEFICIENCY: ICD-10-CM

## 2021-04-30 DIAGNOSIS — Z11.59 NEED FOR HEPATITIS C SCREENING TEST: ICD-10-CM

## 2021-04-30 LAB
ALBUMIN SERPL-MCNC: 3.9 G/DL (ref 3.5–5.2)
ALBUMIN/GLOBULIN RATIO: 1.5 (ref 1–2.5)
ALP BLD-CCNC: 56 U/L (ref 35–104)
ALT SERPL-CCNC: 17 U/L (ref 5–33)
AST SERPL-CCNC: 18 U/L
BILIRUB SERPL-MCNC: 0.35 MG/DL (ref 0.3–1.2)
BILIRUBIN DIRECT: 0.09 MG/DL
BILIRUBIN, INDIRECT: 0.26 MG/DL (ref 0–1)
CHOLESTEROL/HDL RATIO: 4.3
CHOLESTEROL: 200 MG/DL
ESTIMATED AVERAGE GLUCOSE: 91 MG/DL
FOLATE: 11 NG/ML
GLOBULIN: NORMAL G/DL (ref 1.5–3.8)
HBA1C MFR BLD: 4.8 % (ref 4–6)
HDLC SERPL-MCNC: 46 MG/DL
HEPATITIS C ANTIBODY: NONREACTIVE
HIV AG/AB: NONREACTIVE
LDL CHOLESTEROL: 117 MG/DL (ref 0–130)
TOTAL PROTEIN: 6.5 G/DL (ref 6.4–8.3)
TRIGL SERPL-MCNC: 183 MG/DL
VITAMIN B-12: 474 PG/ML (ref 232–1245)
VITAMIN D 25-HYDROXY: 18.6 NG/ML (ref 30–100)
VLDLC SERPL CALC-MCNC: ABNORMAL MG/DL (ref 1–30)

## 2021-04-30 PROCEDURE — 83036 HEMOGLOBIN GLYCOSYLATED A1C: CPT

## 2021-04-30 PROCEDURE — 80076 HEPATIC FUNCTION PANEL: CPT

## 2021-04-30 PROCEDURE — 82746 ASSAY OF FOLIC ACID SERUM: CPT

## 2021-04-30 PROCEDURE — 86803 HEPATITIS C AB TEST: CPT

## 2021-04-30 PROCEDURE — 80061 LIPID PANEL: CPT

## 2021-04-30 PROCEDURE — 87389 HIV-1 AG W/HIV-1&-2 AB AG IA: CPT

## 2021-04-30 PROCEDURE — 36415 COLL VENOUS BLD VENIPUNCTURE: CPT

## 2021-04-30 PROCEDURE — 82306 VITAMIN D 25 HYDROXY: CPT

## 2021-04-30 PROCEDURE — 82607 VITAMIN B-12: CPT

## 2021-05-24 ENCOUNTER — HOSPITAL ENCOUNTER (OUTPATIENT)
Dept: NON INVASIVE DIAGNOSTICS | Age: 41
Discharge: HOME | End: 2021-05-26
Payer: COMMERCIAL

## 2021-05-24 DIAGNOSIS — R07.9 CHEST PAIN IN ADULT: ICD-10-CM

## 2021-05-24 PROCEDURE — 93017 CV STRESS TEST TRACING ONLY: CPT

## 2021-05-24 NOTE — PROGRESS NOTES
Regular treadmill stress test completed;  Patient tolerated well;  Patient back to baseline; B/P 142/79 , HR 89 ; PT discharge to home

## 2021-05-24 NOTE — PROCEDURES
[address]                [id number] [place], [facility] [address]                              CARDIAC STRESS TEST    PATIENT NAME: Micheal Rey                   :        1980  MED REC NO:   [id number]                             ROOM:  ACCOUNT NO:   [de-identified]                           ADMIT DATE: 2021  PROVIDER:     Cannon Kussmaul, MD    CARDIOVASCULAR DIAGNOSTIC DEPARTMENT    DATE OF STUDY:  2021  ORDERING PROVIDER:  Luan Mcmillan MD  PRIMARY CARE PROVIDER:  Luan Mcmillan MD  INTERPRETING PHYSICIAN:  ARELIS WISEMAN MD,[facility]  TREADMILL STRESS TESTING  TEST TYPE: TREADMILL STRESS TEST  INDICATION: CHEST PAIN  100% MAX PREDICTED HEART RATE: 179 bpm  85% MAX PREDICTED HEART RATE: 152 bpm  RESTING HEART RATE: 61 bpm  MAXIMUM HEART RATE ACHIEVED: 166 bpm  PERCENTAGE OF AGE PREDICTED MAXIMUM ACHIEVED: 92%  RESTING BLOOD PRESSURE: 131/85 mmHg  PEAK BLOOD PRESSURE: 238/69 mmHg  PEAK DOUBLE PRODUCT: 00414 mmHg bpm  METS: 10.10 kcal/kg/hr  SYMPTOM(S): ASYMPTOMATIC. MEDICATION(S) GIVEN: NONE. REASON FOR TERMINATION: 85% MAXIMUM PREDICTED HEART RATE ACHIEVED. TOTAL TIME OF TREADMILL: 07:51 MIN. RESTING EKG: ABNORMAL. LOW VOLTAGE, NORMAL SINUS RHYTHM,  ANTERIOR  INFARCTION  STRESS HEART RESPONSE: NORMAL RESPONSE.  BLOOD PRESSURE RESPONSE: APPROPRIATE. STRESS EKG's: NORMAL. CHEST DISCOMFORT: NO CHEST PAIN DURING STRESS. NO CHEST PAIN DURING  RECOVERY. ISCHEMIC EKG CHANGES: NONE.  EKG IMPRESSION: NEGATIVE ELECTROCARDIOGRAPHICALLY TREADMILL STRESS TEST.   GOOD EXERCISE CAPACITY    ARELIS Lieberman MD    D: 2021 14:51:26       T: 2021 14:52:51     /WILMER  Job#: [id number]     Doc#: Unknown    CC:    (Retain this field even if not dictated or not decipherable)

## 2022-05-12 ENCOUNTER — OFFICE VISIT (OUTPATIENT)
Dept: FAMILY MEDICINE CLINIC | Age: 42
End: 2022-05-12
Payer: COMMERCIAL

## 2022-05-12 VITALS
SYSTOLIC BLOOD PRESSURE: 120 MMHG | HEIGHT: 68 IN | BODY MASS INDEX: 41.01 KG/M2 | DIASTOLIC BLOOD PRESSURE: 83 MMHG | OXYGEN SATURATION: 94 % | TEMPERATURE: 98.4 F | WEIGHT: 270.6 LBS | HEART RATE: 65 BPM

## 2022-05-12 DIAGNOSIS — Z00.00 ENCOUNTER FOR ANNUAL PHYSICAL EXAM: ICD-10-CM

## 2022-05-12 DIAGNOSIS — E55.9 VITAMIN D DEFICIENCY: ICD-10-CM

## 2022-05-12 DIAGNOSIS — E78.5 DYSLIPIDEMIA: ICD-10-CM

## 2022-05-12 DIAGNOSIS — R73.9 HYPERGLYCEMIA: ICD-10-CM

## 2022-05-12 DIAGNOSIS — E88.81 INSULIN RESISTANCE: Primary | ICD-10-CM

## 2022-05-12 DIAGNOSIS — N92.6 IRREGULAR PERIODS: ICD-10-CM

## 2022-05-12 DIAGNOSIS — E66.01 MORBID OBESITY (HCC): ICD-10-CM

## 2022-05-12 PROBLEM — N94.10 DYSPAREUNIA, FEMALE: Status: RESOLVED | Noted: 2017-10-26 | Resolved: 2022-05-12

## 2022-05-12 PROBLEM — R10.2 FEMALE PELVIC PAIN: Status: RESOLVED | Noted: 2017-10-26 | Resolved: 2022-05-12

## 2022-05-12 PROCEDURE — G8419 CALC BMI OUT NRM PARAM NOF/U: HCPCS | Performed by: FAMILY MEDICINE

## 2022-05-12 PROCEDURE — 1036F TOBACCO NON-USER: CPT | Performed by: FAMILY MEDICINE

## 2022-05-12 PROCEDURE — 99214 OFFICE O/P EST MOD 30 MIN: CPT | Performed by: FAMILY MEDICINE

## 2022-05-12 PROCEDURE — G8427 DOCREV CUR MEDS BY ELIG CLIN: HCPCS | Performed by: FAMILY MEDICINE

## 2022-05-12 RX ORDER — ERGOCALCIFEROL 1.25 MG/1
50000 CAPSULE ORAL WEEKLY
Qty: 12 CAPSULE | Refills: 2 | Status: SHIPPED | OUTPATIENT
Start: 2022-05-12

## 2022-05-12 SDOH — ECONOMIC STABILITY: FOOD INSECURITY: WITHIN THE PAST 12 MONTHS, THE FOOD YOU BOUGHT JUST DIDN'T LAST AND YOU DIDN'T HAVE MONEY TO GET MORE.: NEVER TRUE

## 2022-05-12 SDOH — ECONOMIC STABILITY: FOOD INSECURITY: WITHIN THE PAST 12 MONTHS, YOU WORRIED THAT YOUR FOOD WOULD RUN OUT BEFORE YOU GOT MONEY TO BUY MORE.: NEVER TRUE

## 2022-05-12 ASSESSMENT — PATIENT HEALTH QUESTIONNAIRE - PHQ9
SUM OF ALL RESPONSES TO PHQ QUESTIONS 1-9: 0
SUM OF ALL RESPONSES TO PHQ QUESTIONS 1-9: 0
1. LITTLE INTEREST OR PLEASURE IN DOING THINGS: 0
SUM OF ALL RESPONSES TO PHQ9 QUESTIONS 1 & 2: 0
SUM OF ALL RESPONSES TO PHQ QUESTIONS 1-9: 0
SUM OF ALL RESPONSES TO PHQ QUESTIONS 1-9: 0
2. FEELING DOWN, DEPRESSED OR HOPELESS: 0

## 2022-05-12 ASSESSMENT — ENCOUNTER SYMPTOMS
COUGH: 0
BACK PAIN: 0
EYE REDNESS: 0
EYE DISCHARGE: 0
EYE PAIN: 0
CONSTIPATION: 0
DIARRHEA: 0
TROUBLE SWALLOWING: 0
RECTAL PAIN: 0
ABDOMINAL DISTENTION: 0
VOICE CHANGE: 0
BLOOD IN STOOL: 0
SINUS PRESSURE: 0
SHORTNESS OF BREATH: 0
ABDOMINAL PAIN: 0
ANAL BLEEDING: 0
VOMITING: 0
COLOR CHANGE: 0
NAUSEA: 0
CHEST TIGHTNESS: 0

## 2022-05-12 ASSESSMENT — SOCIAL DETERMINANTS OF HEALTH (SDOH): HOW HARD IS IT FOR YOU TO PAY FOR THE VERY BASICS LIKE FOOD, HOUSING, MEDICAL CARE, AND HEATING?: NOT HARD AT ALL

## 2022-05-12 NOTE — PROGRESS NOTES
Subjective:      Patient ID: Rere Kwok is a 43 y.o. female. Here for a physical.Last year labs were completed but did not follow up after. Mild increase in lipids as well as low Vit D noted. Will replace Vit d The patient is asked to make an attempt to improve diet and exercise patterns to aid in medical management of this problem. And exercise plan discussed. FH CAD and DM. Mom and Grandpa. Review of Systems   Constitutional: Negative for activity change, appetite change and fatigue. HENT: Negative for dental problem, ear pain, hearing loss, postnasal drip, sinus pressure, sneezing, tinnitus, trouble swallowing and voice change. Eyes: Negative for pain, discharge, redness and visual disturbance. Respiratory: Negative for cough, chest tightness and shortness of breath. Cardiovascular: Negative for chest pain, palpitations and leg swelling. Gastrointestinal: Negative for abdominal distention, abdominal pain, anal bleeding, blood in stool, constipation, diarrhea, nausea, rectal pain and vomiting. Endocrine: Negative for cold intolerance, heat intolerance, polydipsia, polyphagia and polyuria. Genitourinary: Negative for decreased urine volume, difficulty urinating, dyspareunia, dysuria, enuresis, flank pain, frequency, genital sores, hematuria, menstrual problem, pelvic pain, urgency, vaginal bleeding and vaginal discharge. Musculoskeletal: Negative for arthralgias, back pain, gait problem, joint swelling, myalgias, neck pain and neck stiffness. Skin: Negative for color change, pallor and rash. Allergic/Immunologic: Negative for environmental allergies, food allergies and immunocompromised state. Neurological: Negative for dizziness, tremors, seizures, syncope, facial asymmetry, speech difficulty, weakness, light-headedness, numbness and headaches. Hematological: Negative for adenopathy. Does not bruise/bleed easily.    Psychiatric/Behavioral: Negative for agitation, behavioral problems, confusion, decreased concentration, sleep disturbance and suicidal ideas. The patient is not nervous/anxious. Objective:   Physical Exam  Constitutional:       General: She is not in acute distress. Appearance: Normal appearance. HENT:      Head: Normocephalic and atraumatic. Right Ear: External ear normal.      Left Ear: External ear normal.      Nose: Nose normal.      Mouth/Throat:      Mouth: Mucous membranes are moist.   Eyes:      Conjunctiva/sclera: Conjunctivae normal.      Pupils: Pupils are equal, round, and reactive to light. Neck:      Thyroid: No thyromegaly. Trachea: No tracheal deviation. Cardiovascular:      Rate and Rhythm: Normal rate and regular rhythm. Pulses: Normal pulses. Heart sounds: Normal heart sounds. No murmur heard. No friction rub. No gallop. Pulmonary:      Effort: Pulmonary effort is normal. No respiratory distress. Breath sounds: Normal breath sounds. No stridor. No wheezing or rales. Chest:      Chest wall: No tenderness. Abdominal:      General: Bowel sounds are normal. There is no distension. Palpations: Abdomen is soft. Tenderness: There is no abdominal tenderness. There is no rebound. Musculoskeletal:         General: Normal range of motion. Cervical back: Normal range of motion. Lymphadenopathy:      Cervical: No cervical adenopathy. Skin:     General: Skin is warm. Coloration: Skin is not pale. Findings: No erythema or rash. Neurological:      General: No focal deficit present. Mental Status: She is alert and oriented to person, place, and time. Mental status is at baseline. Cranial Nerves: No cranial nerve deficit. Motor: No abnormal muscle tone. Deep Tendon Reflexes: Reflexes normal.   Psychiatric:         Mood and Affect: Mood normal.         Thought Content:  Thought content normal.         Judgment: Judgment normal.          Vitals:    05/12/22 1522   BP: 120/83   Pulse: 65   Temp: 98.4 °F (36.9 °C)   SpO2: 94%         Assessment:      Diagnosis Orders   1. Insulin resistance  Insulin, Free   2. Encounter for annual physical exam  Comprehensive Metabolic Panel    Lipid Panel   3. Vitamin D deficiency  vitamin D (ERGOCALCIFEROL) 1.25 MG (81610 UT) CAPS capsule   4. Hyperglycemia  Hemoglobin A1C   5. Dyslipidemia     6. Irregular periods     7. Morbid obesity ([facility] [address]           Plan:     Orders Placed This Encounter   Procedures    Comprehensive Metabolic Panel    Lipid Panel    Insulin, Free    Hemoglobin A1C       Outpatient Encounter Medications as of 5/12/2022   Medication Sig Dispense Refill    vitamin D (ERGOCALCIFEROL) 1.25 MG (74429 UT) CAPS capsule Take 1 capsule by mouth once a week 12 capsule 2     No facility-administered encounter medications on file as of 5/12/2022.     20 minutes spent with patient counseling/educating on acute/chronic medical health problems, medications,  along with treatment options. Reviewed multiple labs/imaging/medications with patient during this time. Following Diet discussion/education was done on Weight Lose Diet. In addition Exercise plan and depression screen were discussed. Recent labs/imaging were discussed and reviewed with patient.

## 2022-05-14 ENCOUNTER — HOSPITAL ENCOUNTER (OUTPATIENT)
Facility: CLINIC | Age: 42
Discharge: HOME | End: 2022-05-14
Payer: COMMERCIAL

## 2022-05-14 DIAGNOSIS — R73.9 HYPERGLYCEMIA: ICD-10-CM

## 2022-05-14 DIAGNOSIS — E88.81 INSULIN RESISTANCE: ICD-10-CM

## 2022-05-14 DIAGNOSIS — Z00.00 ENCOUNTER FOR ANNUAL PHYSICAL EXAM: ICD-10-CM

## 2022-05-14 LAB
ALBUMIN SERPL-MCNC: 4.1 G/DL (ref 3.5–5.2)
ALBUMIN/GLOBULIN RATIO: 1.3 (ref 1–2.5)
ALP BLD-CCNC: 70 U/L (ref 35–104)
ALT SERPL-CCNC: 12 U/L (ref 5–33)
ANION GAP SERPL CALCULATED.3IONS-SCNC: 10 MMOL/L (ref 9–17)
AST SERPL-CCNC: 20 U/L
BILIRUB SERPL-MCNC: 0.4 MG/DL (ref 0.3–1.2)
BUN BLDV-MCNC: 13 MG/DL (ref 6–20)
CALCIUM SERPL-MCNC: 9.2 MG/DL (ref 8.6–10.4)
CHLORIDE BLD-SCNC: 105 MMOL/L (ref 98–107)
CHOLESTEROL/HDL RATIO: 4.1
CHOLESTEROL: 182 MG/DL
CO2: 24 MMOL/L (ref 20–31)
CREAT SERPL-MCNC: 0.72 MG/DL (ref 0.5–0.9)
GFR AFRICAN AMERICAN: >60 ML/MIN
GFR NON-AFRICAN AMERICAN: >60 ML/MIN
GFR SERPL CREATININE-BSD FRML MDRD: NORMAL ML/MIN/{1.73_M2}
GLUCOSE BLD-MCNC: 92 MG/DL (ref 70–99)
HDLC SERPL-MCNC: 44 MG/DL
LDL CHOLESTEROL: 121 MG/DL (ref 0–130)
POTASSIUM SERPL-SCNC: 4.8 MMOL/L (ref 3.7–5.3)
SODIUM BLD-SCNC: 139 MMOL/L (ref 135–144)
TOTAL PROTEIN: 7.3 G/DL (ref 6.4–8.3)
TRIGL SERPL-MCNC: 85 MG/DL

## 2022-05-14 PROCEDURE — 83527 ASSAY OF INSULIN: CPT

## 2022-05-14 PROCEDURE — 36415 COLL VENOUS BLD VENIPUNCTURE: CPT

## 2022-05-14 PROCEDURE — 83036 HEMOGLOBIN GLYCOSYLATED A1C: CPT

## 2022-05-14 PROCEDURE — 80061 LIPID PANEL: CPT

## 2022-05-14 PROCEDURE — 83525 ASSAY OF INSULIN: CPT

## 2022-05-14 PROCEDURE — 80053 COMPREHEN METABOLIC PANEL: CPT

## 2022-05-15 LAB
ESTIMATED AVERAGE GLUCOSE: 100 MG/DL
HBA1C MFR BLD: 5.1 % (ref 4–6)

## 2022-05-17 LAB
INSULIN FREE: 16 UIU/ML (ref 3–25)
INSULIN: 22 UIU/ML (ref 3–25)

## 2022-06-11 PROBLEM — Z00.00 ENCOUNTER FOR ANNUAL PHYSICAL EXAM: Status: RESOLVED | Noted: 2022-05-12 | Resolved: 2022-06-11

## 2022-06-16 ENCOUNTER — OFFICE VISIT (OUTPATIENT)
Dept: FAMILY MEDICINE CLINIC | Age: 42
End: 2022-06-16
Payer: COMMERCIAL

## 2022-06-16 VITALS
HEIGHT: 68 IN | BODY MASS INDEX: 41.13 KG/M2 | TEMPERATURE: 97.9 F | HEART RATE: 69 BPM | OXYGEN SATURATION: 96 % | DIASTOLIC BLOOD PRESSURE: 69 MMHG | SYSTOLIC BLOOD PRESSURE: 107 MMHG | WEIGHT: 271.4 LBS

## 2022-06-16 DIAGNOSIS — E66.01 MORBID OBESITY (HCC): ICD-10-CM

## 2022-06-16 DIAGNOSIS — E78.5 DYSLIPIDEMIA: Primary | ICD-10-CM

## 2022-06-16 DIAGNOSIS — E55.9 VITAMIN D DEFICIENCY: ICD-10-CM

## 2022-06-16 PROCEDURE — 1036F TOBACCO NON-USER: CPT | Performed by: FAMILY MEDICINE

## 2022-06-16 PROCEDURE — G8427 DOCREV CUR MEDS BY ELIG CLIN: HCPCS | Performed by: FAMILY MEDICINE

## 2022-06-16 PROCEDURE — G8417 CALC BMI ABV UP PARAM F/U: HCPCS | Performed by: FAMILY MEDICINE

## 2022-06-16 PROCEDURE — 99213 OFFICE O/P EST LOW 20 MIN: CPT | Performed by: FAMILY MEDICINE

## 2022-06-16 ASSESSMENT — ENCOUNTER SYMPTOMS
ABDOMINAL PAIN: 0
SHORTNESS OF BREATH: 0
BLOOD IN STOOL: 0
EYE PAIN: 0
ABDOMINAL DISTENTION: 0
RECTAL PAIN: 0
SINUS PRESSURE: 0
DIARRHEA: 0
EYE DISCHARGE: 0
CONSTIPATION: 0
COUGH: 0
EYE REDNESS: 0
NAUSEA: 0
CHEST TIGHTNESS: 0
ANAL BLEEDING: 0
VOMITING: 0
COLOR CHANGE: 0
TROUBLE SWALLOWING: 0
BACK PAIN: 0
VOICE CHANGE: 0

## 2022-06-16 ASSESSMENT — PATIENT HEALTH QUESTIONNAIRE - PHQ9
SUM OF ALL RESPONSES TO PHQ QUESTIONS 1-9: 0
SUM OF ALL RESPONSES TO PHQ QUESTIONS 1-9: 0
SUM OF ALL RESPONSES TO PHQ9 QUESTIONS 1 & 2: 0
SUM OF ALL RESPONSES TO PHQ QUESTIONS 1-9: 0
1. LITTLE INTEREST OR PLEASURE IN DOING THINGS: 0
SUM OF ALL RESPONSES TO PHQ QUESTIONS 1-9: 0
2. FEELING DOWN, DEPRESSED OR HOPELESS: 0

## 2022-06-16 NOTE — PROGRESS NOTES
Subjective:      Patient ID: Letty Hutchison is a 43 y.o. female. States had some las done after last visit - he cholesterol has improved with diet and exercise and her Insulin and HbA1C is ok as well. States walks a lot more. States diet has not changed much. States not yet seen nutritionist and has not followed diet advice I gave her but will give referral to see if she want to do this at a future date. Mood, sleep, appetite ok. Review of Systems   Constitutional: Negative for activity change, appetite change and fatigue. HENT: Negative for dental problem, ear pain, hearing loss, postnasal drip, sinus pressure, sneezing, tinnitus, trouble swallowing and voice change. Eyes: Negative for pain, discharge, redness and visual disturbance. Respiratory: Negative for cough, chest tightness and shortness of breath. Cardiovascular: Negative for chest pain, palpitations and leg swelling. Gastrointestinal: Negative for abdominal distention, abdominal pain, anal bleeding, blood in stool, constipation, diarrhea, nausea, rectal pain and vomiting. Endocrine: Negative for cold intolerance, heat intolerance, polydipsia, polyphagia and polyuria. Genitourinary: Negative for decreased urine volume, difficulty urinating, dyspareunia, dysuria, enuresis, flank pain, frequency, genital sores, hematuria, menstrual problem, pelvic pain, urgency, vaginal bleeding and vaginal discharge. Musculoskeletal: Negative for arthralgias, back pain, gait problem, joint swelling, myalgias, neck pain and neck stiffness. Skin: Negative for color change, pallor and rash. Allergic/Immunologic: Negative for environmental allergies, food allergies and immunocompromised state. Neurological: Negative for dizziness, tremors, seizures, syncope, facial asymmetry, speech difficulty, weakness, light-headedness, numbness and headaches. Hematological: Negative for adenopathy. Does not bruise/bleed easily.    Psychiatric/Behavioral: Negative for agitation, behavioral problems, confusion, decreased concentration, sleep disturbance and suicidal ideas. The patient is not nervous/anxious. Objective:   Physical Exam  Constitutional:       General: She is not in acute distress. HENT:      Head: Normocephalic and atraumatic. Right Ear: External ear normal.      Left Ear: External ear normal.      Nose: Nose normal.      Mouth/Throat:      Mouth: Mucous membranes are moist.   Eyes:      Conjunctiva/sclera: Conjunctivae normal.      Pupils: Pupils are equal, round, and reactive to light. Neck:      Thyroid: No thyromegaly. Trachea: No tracheal deviation. Cardiovascular:      Rate and Rhythm: Normal rate and regular rhythm. Pulses: Normal pulses. Heart sounds: Normal heart sounds. No murmur heard. No friction rub. No gallop. Pulmonary:      Effort: Pulmonary effort is normal. No respiratory distress. Breath sounds: Normal breath sounds. No stridor. No wheezing or rales. Chest:      Chest wall: No tenderness. Abdominal:      General: Bowel sounds are normal. There is no distension. Palpations: Abdomen is soft. Tenderness: There is no abdominal tenderness. There is no rebound. Musculoskeletal:         General: Normal range of motion. Cervical back: Normal range of motion. Lymphadenopathy:      Cervical: No cervical adenopathy. Skin:     General: Skin is warm. Coloration: Skin is not pale. Findings: No erythema or rash. Neurological:      General: No focal deficit present. Mental Status: She is alert and oriented to person, place, and time. Mental status is at baseline. Cranial Nerves: No cranial nerve deficit. Motor: No abnormal muscle tone. Deep Tendon Reflexes: Reflexes normal.   Psychiatric:         Mood and Affect: Mood normal.         Behavior: Behavior normal.         Thought Content:  Thought content normal.         Judgment: Judgment normal. Vitals:    06/16/22 1522   BP: 107/69   Pulse: 69   Temp: 97.9 °F (36.6 °C)   SpO2: 96%         Assessment:      Diagnosis Orders   1. Dyslipidemia     2. Vitamin D deficiency     3. Morbid obesity ([address]           Plan:     Orders Placed This Encounter   Procedures    Comprehensive Metabolic Panel    Vitamin D 25 Hydroxy    Lipid Panel   [facility]       Outpatient Encounter Medications as of 6/16/2022   Medication Sig Dispense Refill    vitamin D (ERGOCALCIFEROL) 1.25 MG (11042 UT) CAPS capsule Take 1 capsule by mouth once a week 12 capsule 2     No facility-administered encounter medications on file as of 6/16/2022.     20 minutes spent with patient counseling/educating on acute/chronic medical health problems, medications,  along with treatment options. Reviewed multiple labs/imaging/medications with patient during this time. Following Diet discussion/education was done on Diet plan Cholesterol Diet . In addition Exercise plan and depression screen were discussed. Recent labs/imaging were discussed and reviewed with patient.

## 2023-01-07 ENCOUNTER — HOSPITAL ENCOUNTER (OUTPATIENT)
Facility: CLINIC | Age: 43
Discharge: HOME | End: 2023-01-07
Payer: COMMERCIAL

## 2023-01-07 DIAGNOSIS — E55.9 VITAMIN D DEFICIENCY: ICD-10-CM

## 2023-01-07 DIAGNOSIS — E78.5 DYSLIPIDEMIA: ICD-10-CM

## 2023-01-07 LAB
ALBUMIN SERPL-MCNC: 3.9 G/DL (ref 3.5–5.2)
ALBUMIN/GLOBULIN RATIO: 1.3 (ref 1–2.5)
ALP BLD-CCNC: 72 U/L (ref 35–104)
ALT SERPL-CCNC: 22 U/L (ref 5–33)
ANION GAP SERPL CALCULATED.3IONS-SCNC: 13 MMOL/L (ref 9–17)
AST SERPL-CCNC: 22 U/L
BILIRUB SERPL-MCNC: 0.5 MG/DL (ref 0.3–1.2)
BUN BLDV-MCNC: 14 MG/DL (ref 6–20)
CALCIUM SERPL-MCNC: 8.8 MG/DL (ref 8.6–10.4)
CHLORIDE BLD-SCNC: 103 MMOL/L (ref 98–107)
CO2: 24 MMOL/L (ref 20–31)
CREAT SERPL-MCNC: 0.79 MG/DL (ref 0.5–0.9)
GFR SERPL CREATININE-BSD FRML MDRD: >60 ML/MIN/1.73M2
GLUCOSE BLD-MCNC: 81 MG/DL (ref 70–99)
POTASSIUM SERPL-SCNC: 4.2 MMOL/L (ref 3.7–5.3)
SODIUM BLD-SCNC: 140 MMOL/L (ref 135–144)
TOTAL PROTEIN: 6.9 G/DL (ref 6.4–8.3)
VITAMIN D 25-HYDROXY: 23.6 NG/ML

## 2023-01-07 PROCEDURE — 36415 COLL VENOUS BLD VENIPUNCTURE: CPT

## 2023-01-07 PROCEDURE — 80053 COMPREHEN METABOLIC PANEL: CPT

## 2023-01-07 PROCEDURE — 82306 VITAMIN D 25 HYDROXY: CPT

## 2023-01-07 PROCEDURE — 80061 LIPID PANEL: CPT

## 2023-01-08 LAB
CHOLESTEROL/HDL RATIO: 4
CHOLESTEROL: 181 MG/DL
HDLC SERPL-MCNC: 45 MG/DL
LDL CHOLESTEROL: 107 MG/DL (ref 0–130)
TRIGL SERPL-MCNC: 143 MG/DL

## 2023-03-03 ENCOUNTER — HOSPITAL ENCOUNTER (OUTPATIENT)
Age: 43
Setting detail: SPECIMEN
Discharge: HOME | End: 2023-03-03

## 2023-03-03 ENCOUNTER — OFFICE VISIT (OUTPATIENT)
Dept: OBGYN CLINIC | Age: 43
End: 2023-03-03

## 2023-03-03 VITALS — DIASTOLIC BLOOD PRESSURE: 72 MMHG | BODY MASS INDEX: 42.88 KG/M2 | SYSTOLIC BLOOD PRESSURE: 126 MMHG | WEIGHT: 282 LBS

## 2023-03-03 DIAGNOSIS — Z01.419 WELL WOMAN EXAM: Primary | ICD-10-CM

## 2023-03-03 DIAGNOSIS — N91.2 AMENORRHEA: ICD-10-CM

## 2023-03-03 DIAGNOSIS — E28.2 PCOS (POLYCYSTIC OVARIAN SYNDROME): ICD-10-CM

## 2023-03-03 DIAGNOSIS — Z12.31 SCREENING MAMMOGRAM FOR BREAST CANCER: ICD-10-CM

## 2023-03-03 ASSESSMENT — PATIENT HEALTH QUESTIONNAIRE - PHQ9
SUM OF ALL RESPONSES TO PHQ QUESTIONS 1-9: 0
SUM OF ALL RESPONSES TO PHQ QUESTIONS 1-9: 0
1. LITTLE INTEREST OR PLEASURE IN DOING THINGS: 0
SUM OF ALL RESPONSES TO PHQ QUESTIONS 1-9: 0
2. FEELING DOWN, DEPRESSED OR HOPELESS: 0
SUM OF ALL RESPONSES TO PHQ QUESTIONS 1-9: 0
SUM OF ALL RESPONSES TO PHQ9 QUESTIONS 1 & 2: 0

## 2023-03-03 ASSESSMENT — ENCOUNTER SYMPTOMS
ABDOMINAL PAIN: 0
SHORTNESS OF BREATH: 0
NAUSEA: 0
VOMITING: 0
DIARRHEA: 0

## 2023-03-03 NOTE — PROGRESS NOTES
[address] [facility] [place]  [place]  [address] [address]  Dept: [phone]    Patient Name: Gabe Staton  Patient Age: 37 y.o. Date of Visit: 3/3/2023    Subjective  Chief Complaint   Patient presents with    Annual Exam     Patient's last menstrual period was 08/01/2022 (approximate). Chaperone for Intimate Exam  Chaperone was offered as part of the rooming process. Patient declined and agrees to continue with exam without a chaperone. Chaperone: n/a    HPI  Patient arrives for annual exam.  Patient reports is  sexually active with 1 male partner(s). Patient denies  pain with sex . Patient denies a history of sexually transmitted infection(s). none  Patient does not want screening for sexually transmitted infection(s). Reports is not on contraception tubal essure  Essure in 2012  Went on birth control pills in summer short lived did not like how it made her feel  Last had a menstrual cycle in 8/2022 (on birth control) unsure when last one was before then maybe a year? PHQ Scores 3/3/2023 6/16/2022 5/12/2022 4/29/2021 4/29/2021 10/13/2016   PHQ2 Score 0 0 0 0 0 0   PHQ9 Score 0 0 0 0 0 0     Interpretation of Total Score Depression Severity: 1-4 = Minimal depression, 5-9 = Mild depression, 10-14 = Moderate depression, 15-19 = Moderately severe depression, 20-27 = Severe depression        Review of Systems   Constitutional:  Negative for unexpected weight change. Respiratory:  Negative for shortness of breath. Cardiovascular:  Negative for chest pain, palpitations and leg swelling. Gastrointestinal:  Negative for abdominal pain, diarrhea, nausea and vomiting. Genitourinary:  Positive for menstrual problem. Negative for difficulty urinating, dyspareunia, vaginal discharge and vaginal pain. Neurological:  Negative for dizziness, light-headedness and headaches.      Preventive Health Screening:   Date of last pap: 2021 normal  Cytology Report   Date Value Ref Range Status   02/01/2021       INTERPRETATION    Cervical material, (ThinPrep vial, Imaging-assisted review):  Specimen Adequacy:       Satisfactory for evaluation.       - Endocervical/transformation zone component present. Descriptive Diagnosis:       Negative for intraepithelial lesion or malignancy. Reactive cellular changes associated with inflammation. Comments:       High Risk HPV testing was ordered. Cytotechnologist:   FREDI Belle M.D.  **Electronically Signed Out**         sls/2/12/2021          Procedure/Addendum  HPV Procedure Report     Date Ordered:     2/4/2021     Status: Signed  Out       Date Complete:     2/4/2021     By: RAJWINDER Maher(ASCP)       Date Reported:     2/15/2021       INTERPRETATION  Roche HPV DNA High Risk                                  HPV Sample               Thin Prep                    (Ref Range)  HPV Type 16               Not Detected                    (Not  Detected)  HPV Type 18               Not Detected                    (Not  Detected)  Other High Risk HPV     Not Detected                    (Not Detected)       This test amplifies and detects DNA of 14 high-risk HPV types  associated with cervical cancer and its precursor lesions (HPV types  16, 18, 31, 33, 35, 39, 45, 51, 52, 56, 58, 59, 66, and 68). Sensitivity may be affected by specimen collection methods, stage of  infection, and the presence of interfering substances. Results should  be interpreted in conjunction with other available laboratory and  clinical data. A negative high-risk HPV result does not exclude the  possibility of future cytologic HSIL or underlying CIN2-3 or cancer. This test is intended for medical purposes only and is not valid for  the evaluation of suspected sexual abuse or for other forensic  purposes.                  Source:  1: Cervical material, (ThinPrep vial, Imaging-assisted review)    Clinical History  Z01.419 Routine gyn exam without abnormal findings  Co-Test:  ThinPrep Pap with high risk HPV testing    GYNECOLOGIC CYTOLOGY REPORT    Patient Name: Kristin Ralph Med Rec: [id number]  Path Number: [id number]  [address] [place] [address], [place] [address]. [place], [address]  [phone]  Fax: [phone]                    HPV typing/date: 2021  negative  History of Abnormal Paps: no  Date of last mammogram: never had one     Preventive screening: Yes    Family history of Breast, Ovarian, Colon or Uterine Cancer:  no     If Yes see scanned worksheet    Objective  /72 (Site: Left Upper Arm, Position: Sitting, Cuff Size: Large Adult)   Wt 282 lb (127.9 kg)   LMP 08/01/2022 (Approximate)   BMI 42.88 kg/m²       Gynecologic History  NONE    Sexually active: Yes  New Sex Partner within 3 months: No  Domestic Violence Screening: negative    STD history: No  none    Birth control method :No hx of essure      Physical Exam  Constitutional:       General: She is not in acute distress. Appearance: Normal appearance. She is not ill-appearing or diaphoretic. Genitourinary:      No lesions in the vagina. Right Labia: No rash, tenderness, lesions or skin changes. Left Labia: No tenderness, lesions, skin changes or rash. No vaginal erythema, tenderness, bleeding or ulceration. No cervical motion tenderness or friability. Breasts:     Right: No inverted nipple, mass, nipple discharge or skin change. Left: No inverted nipple, mass, nipple discharge or skin change. HENT:      Head: Normocephalic. Cardiovascular:      Rate and Rhythm: Normal rate and regular rhythm. Pulses: Normal pulses. Heart sounds: Normal heart sounds. Pulmonary:      Effort: Pulmonary effort is normal. No respiratory distress. Breath sounds: Normal breath sounds. No stridor. No rhonchi. Abdominal:      General: Abdomen is flat.  There is no distension. Palpations: Abdomen is soft. Tenderness: There is no abdominal tenderness. Musculoskeletal:         General: Normal range of motion. Cervical back: Normal range of motion. No rigidity or tenderness. Neurological:      Mental Status: She is alert and oriented to person, place, and time. Skin:     General: Skin is warm and dry. Psychiatric:         Mood and Affect: Mood normal.         Behavior: Behavior normal.         Thought Content: Thought content normal.         Judgment: Judgment normal.   Vitals reviewed. Assessment & Plan  1. Well woman exam  - PAP SMEAR; Future  Age 25 and > Well Woman Care    General Health:  [x] Alcohol screening & counseling  none  [x] Blood pressure screening: normal  [x] [facility]ptive counseling & methods[facility]  [x] Diabetes Screening: Follows with PCP  [x] Folic acid supplementation Reviewed recommended during the childbearing years  [x] Healthful diet & activity counseling:  discussed  [x] Intimate partner violence screening Negative  [x] Depression/Anxiety screening Negative  [x] Lipid screening: Follows with PCP  Henrry Jean MD  [x] Obesity Screening: Body mass index is 42.88 kg/m². abnormal: obese   [x] Osteoporosis screening Reviewed vitamin D and calcium recommendations  [x] Substance use screening & counseling Denies any illicit drug use  [x] Tobacco screening: positive  [x] Urinary incontinence screening    Infectious Diseases:  [x] Gonorrhea & chlamydia screening: discussed declined  [x] Hepatitis C Screening declined  [x] HIV risk assessment/ testing (at least once in lifetime): declined    Cancer:  [x] Cervical cancer screening: discussed Performed  Reviewed that estimates suggest that 50% of the women with cervical cancer is diagnosed never had cervical cytology testing, and another 10% had not been screened within 5 years before diagnosis.   Reviewed only a small fraction of women infected with high-risk (human papilloma virus) HPV will develop significant cervical abnormalities and cancer. Reviewed HPV-16 has the highest carcinogenic potential accounting for approximately 50-60% of all cases of cervical cancer worldwide and HPV-18 is the next most associated with cervical cancer and is responsible for 10-15% of cases of cervical cancer. HPV infection is most common in teenagers and women in their early 19's. Reviewed most young women, especially those younger than 21 years, have an effective immune response that clears the infection on it's own. Discussed HPV infection detected in women older than 27years old is more likely to reflect persistent infection. Discussed that screen should began at age 24 regardless of the age of sexual initiation. Reviewed that HPV is acquired through sexual intercourse   Discussed most HPV-related types of cervical neoplasia progress very slowly. [x] Mammograms (started at 39yrs old): Discussed, Ordered Reviewed recommendations for yearly mammograms   [x] BRCA testing risk assessment: discussed No baseline elevations    Education:  - Reviewed the importance of breast cancer screening    2. Screening mammogram for breast cancer  - EDGAR DIGITAL SCREEN W OR WO CAD BILATERAL; Future    3. Amenorrhea  - Discussed treatment options with patient. She has tried oral contraception and reports it made her crazy. Is not interested in and IUD. Reviewed medical management options. Patient unsure how she wants to proceed. Is open to completing ultrasound and meeting with [facility]; Future  - Estradiol; Future  - TSH; Future  - T4, Free; Future  - Hemoglobin A1C; Future  - Insulin, total; Future  - US PELVIS COMPLETE NON-OB TRANSABDOMINAL AND TRANSVAGINAL; Future    4.  PCOS (polycystic ovarian syndrome)  - Reviewed with Michael Cazares his is no universally accepted definition of polycystic ovary syndrome.  - Reviewed [place] Consensus Criteria 2003 (two out of three required for diagnosis) is used by provider.  - Reviewed hyperandrogenism is diagnosed by the presence of hirsutism, acne and/or biochemical hyperandrogenemia. - Reviewed that polycystic ovaries alone are a very nonspecific findings and as an isolated finding is not diagnostic for polycystic ovarian syndrome.  - Reviewed that women with polycystic ovarian syndrome are at increased risk of insulin resistance, metabolic syndrome, nonalcoholic fatty liver disease, and obesity-related disorders including sleep apnea. - Discussed with Lauren Menchaca that women with polycystic ovarian syndrome have multiple risk factors for endometrial cancer including chronic anovulation, centripetal obesity, and diabetes. - Discussed that women with polycystic ovarian syndrome has anywhere from two to fivefold increased risk of diabetes. Discussed recommendations for screening for type 2 diabetes including 2-hour glucola. Discussed that fasting glucose levels alone are poor predictors of glucose intolerance risk in women with polycystic ovarian syndrome.   - Discussed women with polycystic ovarian syndrome have an increased risk of cardiovascular disease. Reviewed recommendation for monitoring BMI and fasting lipids.   - Reviewed that regular exercise and weight control are proven to reduce mortality and morbidity. Reviewed with patient based on previous ultrasound on 2/2/21 right ovarian volume was greater than 10, that with amenorrhea would under the [place] criteria meed criteria for polycystic ovarian syndrome. Reviewed last ultrasound that recommended follow up with , patient unsure why it didn't happen. FSH/estradiol to rule out menopause, low suspicion     - Follicle Stimulating Hormone; Future  - Estradiol; Future  - TSH; Future  - T4, Free; Future  - Hemoglobin A1C; Future  - Insulin, total; Future  - US PELVIS COMPLETE NON-OB TRANSABDOMINAL AND TRANSVAGINAL;  Future        Return if symptoms worsen or fail to improve, for  discuss amenorrhea/PCOS/USN results.        Electronically Signed by Magali Nicholson, Lynsey Raines

## 2023-03-04 ENCOUNTER — HOSPITAL ENCOUNTER (OUTPATIENT)
Facility: CLINIC | Age: 43
Discharge: HOME | End: 2023-03-04
Payer: COMMERCIAL

## 2023-03-04 DIAGNOSIS — E28.2 PCOS (POLYCYSTIC OVARIAN SYNDROME): ICD-10-CM

## 2023-03-04 DIAGNOSIS — N91.2 AMENORRHEA: ICD-10-CM

## 2023-03-04 LAB
ESTRADIOL LEVEL: 161.8 PG/ML (ref 27–314)
FOLLICLE STIMULATING HORMONE: 1.3 MIU/ML (ref 1.7–21.5)
INSULIN COMMENT: NORMAL
INSULIN REFERENCE RANGE:: NORMAL
INSULIN: 24.2 MU/L
T4 FREE SERPL-MCNC: 1.15 NG/DL (ref 0.93–1.7)
TSH SERPL-ACNC: 3.4 UIU/ML (ref 0.3–5)

## 2023-03-04 PROCEDURE — 84443 ASSAY THYROID STIM HORMONE: CPT

## 2023-03-04 PROCEDURE — 83001 ASSAY OF GONADOTROPIN (FSH): CPT

## 2023-03-04 PROCEDURE — 84439 ASSAY OF FREE THYROXINE: CPT

## 2023-03-04 PROCEDURE — 36415 COLL VENOUS BLD VENIPUNCTURE: CPT

## 2023-03-04 PROCEDURE — 83036 HEMOGLOBIN GLYCOSYLATED A1C: CPT

## 2023-03-04 PROCEDURE — 83525 ASSAY OF INSULIN: CPT

## 2023-03-04 PROCEDURE — 82670 ASSAY OF TOTAL ESTRADIOL: CPT

## 2023-03-05 LAB
EST. AVERAGE GLUCOSE BLD GHB EST-MCNC: 100 MG/DL
HBA1C MFR BLD: 5.1 % (ref 4–6)

## 2023-03-13 ENCOUNTER — HOSPITAL ENCOUNTER (OUTPATIENT)
Facility: CLINIC | Age: 43
Discharge: HOME | End: 2023-03-15
Payer: COMMERCIAL

## 2023-03-13 ENCOUNTER — HOSPITAL ENCOUNTER (OUTPATIENT)
Dept: GENERAL RADIOLOGY | Facility: CLINIC | Age: 43
Discharge: HOME | End: 2023-03-15
Payer: COMMERCIAL

## 2023-03-13 DIAGNOSIS — M25.50 ARTHRALGIA, UNSPECIFIED JOINT: ICD-10-CM

## 2023-03-13 PROCEDURE — 73610 X-RAY EXAM OF ANKLE: CPT

## 2023-04-03 ENCOUNTER — HOSPITAL ENCOUNTER (OUTPATIENT)
Facility: CLINIC | Age: 43
Discharge: HOME | End: 2023-04-03
Payer: COMMERCIAL

## 2023-04-03 PROCEDURE — 86431 RHEUMATOID FACTOR QUANT: CPT

## 2023-04-03 PROCEDURE — 85652 RBC SED RATE AUTOMATED: CPT

## 2023-04-03 PROCEDURE — 84550 ASSAY OF BLOOD/URIC ACID: CPT

## 2023-04-03 PROCEDURE — 36415 COLL VENOUS BLD VENIPUNCTURE: CPT

## 2023-04-03 PROCEDURE — 86038 ANTINUCLEAR ANTIBODIES: CPT

## 2023-04-06 LAB
ANA SER QL IA: NEGATIVE
DSDNA IGG SER QL IA: 1.1 IU/ML
ERYTHROCYTE [SEDIMENTATION RATE] IN BLOOD BY WESTERGREN METHOD: 21 MM/HR (ref 0–20)
NUCLEAR IGG SER IA-RTO: 0.2 U/ML
RHEUMATOID FACTOR: <10 IU/ML
URATE SERPL-MCNC: 4.7 MG/DL (ref 2.4–5.7)

## 2023-04-21 ENCOUNTER — HOSPITAL ENCOUNTER (OUTPATIENT)
Dept: MRI IMAGING | Facility: CLINIC | Age: 43
Discharge: HOME | End: 2023-04-21
Payer: COMMERCIAL

## 2023-04-21 DIAGNOSIS — R52 PAIN: ICD-10-CM

## 2023-04-21 PROCEDURE — 73721 MRI JNT OF LWR EXTRE W/O DYE: CPT

## 2023-07-29 DIAGNOSIS — E55.9 VITAMIN D DEFICIENCY: ICD-10-CM

## 2023-07-31 RX ORDER — ERGOCALCIFEROL 1.25 MG/1
CAPSULE ORAL
Qty: 4 CAPSULE | OUTPATIENT
Start: 2023-07-31

## 2024-03-22 ENCOUNTER — HOSPITAL ENCOUNTER (OUTPATIENT)
Facility: CLINIC | Age: 44
Discharge: HOME | End: 2024-03-22
Payer: COMMERCIAL

## 2024-03-22 LAB
ALBUMIN SERPL-MCNC: 3.9 G/DL (ref 3.5–5.2)
ALBUMIN/GLOB SERPL: 1 {RATIO} (ref 1–2.5)
ALP SERPL-CCNC: 79 U/L (ref 35–104)
ALT SERPL-CCNC: 14 U/L (ref 10–35)
ANION GAP SERPL CALCULATED.3IONS-SCNC: 11 MMOL/L (ref 9–16)
AST SERPL-CCNC: 18 U/L (ref 10–35)
BILIRUB SERPL-MCNC: 0.3 MG/DL (ref 0–1.2)
BUN SERPL-MCNC: 14 MG/DL (ref 6–20)
CALCIUM SERPL-MCNC: 8.6 MG/DL (ref 8.6–10.4)
CHLORIDE SERPL-SCNC: 104 MMOL/L (ref 98–107)
CHOLEST SERPL-MCNC: 178 MG/DL (ref 0–199)
CHOLESTEROL/HDL RATIO: 4
CO2 SERPL-SCNC: 21 MMOL/L (ref 20–31)
CREAT SERPL-MCNC: 0.7 MG/DL (ref 0.5–0.9)
GFR SERPL CREATININE-BSD FRML MDRD: >60 ML/MIN/1.73M2
GLUCOSE SERPL-MCNC: 89 MG/DL (ref 74–99)
HDLC SERPL-MCNC: 45 MG/DL
LDLC SERPL CALC-MCNC: 110 MG/DL (ref 0–100)
POTASSIUM SERPL-SCNC: 4.4 MMOL/L (ref 3.7–5.3)
PROT SERPL-MCNC: 7 G/DL (ref 6.6–8.7)
SODIUM SERPL-SCNC: 136 MMOL/L (ref 136–145)
TRIGL SERPL-MCNC: 114 MG/DL
VLDLC SERPL CALC-MCNC: 23 MG/DL

## 2024-03-22 PROCEDURE — 80053 COMPREHEN METABOLIC PANEL: CPT

## 2024-03-22 PROCEDURE — 36415 COLL VENOUS BLD VENIPUNCTURE: CPT

## 2024-03-22 PROCEDURE — 80061 LIPID PANEL: CPT

## 2025-07-25 ENCOUNTER — HOSPITAL ENCOUNTER (OUTPATIENT)
Facility: CLINIC | Age: 45
Discharge: HOME | End: 2025-07-25
Payer: COMMERCIAL

## 2025-07-25 LAB
ALBUMIN SERPL-MCNC: 3.9 G/DL (ref 3.5–5.2)
ALBUMIN/GLOB SERPL: 1.1 {RATIO} (ref 1–2.5)
ALP SERPL-CCNC: 59 U/L (ref 35–104)
ALT SERPL-CCNC: 21 U/L (ref 10–35)
ANION GAP SERPL CALCULATED.3IONS-SCNC: 12 MMOL/L (ref 9–16)
AST SERPL-CCNC: 30 U/L (ref 10–35)
BASOPHILS # BLD: 0.04 K/UL (ref 0–0.2)
BASOPHILS NFR BLD: 1 % (ref 0–2)
BILIRUB SERPL-MCNC: 0.4 MG/DL (ref 0–1.2)
BUN SERPL-MCNC: 8 MG/DL (ref 6–20)
CALCIUM SERPL-MCNC: 9 MG/DL (ref 8.6–10.4)
CHLORIDE SERPL-SCNC: 104 MMOL/L (ref 98–107)
CHOLEST SERPL-MCNC: 163 MG/DL (ref 0–199)
CHOLESTEROL/HDL RATIO: 4.1
CO2 SERPL-SCNC: 23 MMOL/L (ref 20–31)
CREAT SERPL-MCNC: 0.9 MG/DL (ref 0.6–0.9)
EOSINOPHIL # BLD: 0.25 K/UL (ref 0–0.44)
EOSINOPHILS RELATIVE PERCENT: 4 % (ref 1–4)
ERYTHROCYTE [DISTWIDTH] IN BLOOD BY AUTOMATED COUNT: 13.2 % (ref 11.8–14.4)
EST. AVERAGE GLUCOSE BLD GHB EST-MCNC: 100 MG/DL
GFR, ESTIMATED: 80 ML/MIN/1.73M2
GLUCOSE P FAST SERPL-MCNC: 86 MG/DL (ref 74–99)
HBA1C MFR BLD: 5.1 % (ref 4–6)
HCT VFR BLD AUTO: 42.5 % (ref 36.3–47.1)
HDLC SERPL-MCNC: 40 MG/DL
HGB BLD-MCNC: 14.2 G/DL (ref 11.9–15.1)
IMM GRANULOCYTES # BLD AUTO: <0.03 K/UL (ref 0–0.3)
IMM GRANULOCYTES NFR BLD: 0 %
LDLC SERPL CALC-MCNC: 90 MG/DL (ref 0–100)
LYMPHOCYTES NFR BLD: 1.53 K/UL (ref 1.1–3.7)
LYMPHOCYTES RELATIVE PERCENT: 24 % (ref 24–43)
MCH RBC QN AUTO: 30.1 PG (ref 25.2–33.5)
MCHC RBC AUTO-ENTMCNC: 33.4 G/DL (ref 28.4–34.8)
MCV RBC AUTO: 90.2 FL (ref 82.6–102.9)
MONOCYTES NFR BLD: 0.49 K/UL (ref 0.1–1.2)
MONOCYTES NFR BLD: 8 % (ref 3–12)
NEUTROPHILS NFR BLD: 63 % (ref 36–65)
NEUTS SEG NFR BLD: 3.94 K/UL (ref 1.5–8.1)
NRBC BLD-RTO: 0 PER 100 WBC
PLATELET # BLD AUTO: 296 K/UL (ref 138–453)
PMV BLD AUTO: 10.6 FL (ref 8.1–13.5)
POTASSIUM SERPL-SCNC: 4.4 MMOL/L (ref 3.7–5.3)
PROT SERPL-MCNC: 7.3 G/DL (ref 6.6–8.7)
RBC # BLD AUTO: 4.71 M/UL (ref 3.95–5.11)
SODIUM SERPL-SCNC: 139 MMOL/L (ref 136–145)
TRIGL SERPL-MCNC: 167 MG/DL (ref 0–149)
TSH SERPL DL<=0.05 MIU/L-ACNC: 3.45 UIU/ML (ref 0.27–4.2)
VLDLC SERPL CALC-MCNC: 33 MG/DL (ref 1–30)
WBC OTHER # BLD: 6.3 K/UL (ref 3.5–11.3)

## 2025-07-25 PROCEDURE — 83036 HEMOGLOBIN GLYCOSYLATED A1C: CPT

## 2025-07-25 PROCEDURE — 85025 COMPLETE CBC W/AUTO DIFF WBC: CPT

## 2025-07-25 PROCEDURE — 80061 LIPID PANEL: CPT

## 2025-07-25 PROCEDURE — 84443 ASSAY THYROID STIM HORMONE: CPT

## 2025-07-25 PROCEDURE — 36415 COLL VENOUS BLD VENIPUNCTURE: CPT

## 2025-07-25 PROCEDURE — 80053 COMPREHEN METABOLIC PANEL: CPT
